# Patient Record
Sex: FEMALE | Race: WHITE | NOT HISPANIC OR LATINO | Employment: FULL TIME | URBAN - METROPOLITAN AREA
[De-identification: names, ages, dates, MRNs, and addresses within clinical notes are randomized per-mention and may not be internally consistent; named-entity substitution may affect disease eponyms.]

---

## 2020-07-17 LAB
EXTERNAL CHLAMYDIA SCREEN: NEGATIVE
EXTERNAL GONORRHEA SCREEN: NEGATIVE
EXTERNAL HEPATITIS B SURFACE ANTIGEN: NEGATIVE
EXTERNAL HEPATITIS C AB: NEGATIVE
EXTERNAL RUBELLA QUALITATIVE: NORMAL
EXTERNAL SYPHILIS RPR SCREEN: NORMAL
EXTERNAL URINE DRUG SCREEN: NEGATIVE
HIV1 P24 AG SERPL QL IA: NORMAL

## 2020-09-25 ENCOUNTER — ROUTINE PRENATAL (OUTPATIENT)
Dept: OBSTETRICS AND GYNECOLOGY | Facility: CLINIC | Age: 30
End: 2020-09-25

## 2020-09-25 VITALS — WEIGHT: 130.3 LBS | DIASTOLIC BLOOD PRESSURE: 62 MMHG | SYSTOLIC BLOOD PRESSURE: 108 MMHG

## 2020-09-25 DIAGNOSIS — Z3A.18 18 WEEKS GESTATION OF PREGNANCY: Primary | ICD-10-CM

## 2020-09-25 LAB
GLUCOSE UR STRIP-MCNC: NEGATIVE MG/DL
PROT UR STRIP-MCNC: NEGATIVE MG/DL

## 2020-09-25 PROCEDURE — 0502F SUBSEQUENT PRENATAL CARE: CPT | Performed by: OBSTETRICS & GYNECOLOGY

## 2020-09-25 RX ORDER — PRENATAL VIT NO.126/IRON/FOLIC 28MG-0.8MG
TABLET ORAL DAILY
COMMUNITY

## 2020-09-25 NOTE — PROGRESS NOTES
OB FOLLOW UP  CC- Here for care of pregnancy        Anayeli Guevara is a 29 y.o.  18w1d patient being seen today for her obstetrical follow up visit. Patient reports headache. Pt denies vision changes. Tylenol does help with headaches. Has noticed a correlation  with sugar intake and her headaches.  She tries to avoid a lot of sugar, but will have something sweet in the afternoon and will get a headache shortly after.      Her prenatal care is complicated by (and status) :    There is no problem list on file for this patient.      Flu Status: Declines    The additional following portions of the patient's history were reviewed and updated as appropriate: allergies, current medications, past family history, past medical history, past social history, past surgical history and problem list.    ROS -   Patient Reports : Headaches  Patient Denies: Loss of Fluid, Vaginal Spotting, Vision Changes and Headaches  Fetal Movement : normal  All other systems reviewed and are negative.     I have reviewed and agree with the HPI, ROS, and historical information as entered above. Gilmar Estrada MD    /62   Wt 59.1 kg (130 lb 4.8 oz)   LMP 2020 (Exact Date)     Ultrasound Today: yes- anatomy scan     EXAM:   Vitals: See prenatal flowsheet   Abdomen: See prenatal flowsheet   Urine glucose/protein: See prenatal flowsheet   Pelvic: See prenatal flowsheet   HRT: See prenatal flowsheet   Presentation: See prenatal flowsheet   Movement: See prenatal flowsheet          Assessment and Plan    Problem List Items Addressed This Visit     None      Visit Diagnoses     18 weeks gestation of pregnancy    -  Primary    Relevant Orders    POC Urinalysis Dipstick (Completed)          1. Pregnancy at 18w1d  2. Fetal status reassuring.   3. Activity and Exercise discussed.  Return in about 4 weeks (around 10/23/2020) for prenatal visit.  EIF  Gilmar Estrada MD  2020

## 2020-10-26 ENCOUNTER — ROUTINE PRENATAL (OUTPATIENT)
Dept: OBSTETRICS AND GYNECOLOGY | Facility: CLINIC | Age: 30
End: 2020-10-26

## 2020-10-26 VITALS — DIASTOLIC BLOOD PRESSURE: 60 MMHG | WEIGHT: 138 LBS | SYSTOLIC BLOOD PRESSURE: 108 MMHG

## 2020-10-26 DIAGNOSIS — Z3A.22 22 WEEKS GESTATION OF PREGNANCY: Primary | ICD-10-CM

## 2020-10-26 LAB
GLUCOSE UR STRIP-MCNC: NEGATIVE MG/DL
PROT UR STRIP-MCNC: NEGATIVE MG/DL

## 2020-10-26 PROCEDURE — 0502F SUBSEQUENT PRENATAL CARE: CPT | Performed by: NURSE PRACTITIONER

## 2020-10-26 NOTE — PROGRESS NOTES
OB FOLLOW UP    Anayeli Guevara is a 29 y.o.  22w4d patient being seen today for her obstetrical follow up visit. Patient reports pelvic pressure. She denies dysuria, spotting, but does have normal discharge.     Her prenatal care is complicated by (and status) :    There is no problem list on file for this patient.      ROS -   Patient Reports : pelvic pressure  Patient Denies:  Loss of Fluid, Vaginal Spotting, Vision Changes, Headaches and Cramping/Contractions   Fetal Movement : normal    I have reviewed and agree with the HPI, ROS, and historical information as entered above. Ryanne Waggoner, APRN    /60   Wt 62.6 kg (138 lb)   LMP 2020 (Exact Date)     Ultrasound Today: no    EXAM:  Vitals: See prenatal flowsheet   Abdomen: See prenatal flowsheet   Urine glucose/protein: See prenatal flowsheet   HRT: See prenatal flowsheet   Presentation: See prenatal flowsheet       Pelvic: Cervix closed on exam     Assessment    1. Pregnancy at 22w4d  2. Fetal status reassuring     Problem List Items Addressed This Visit     None      Visit Diagnoses     22 weeks gestation of pregnancy    -  Primary    Relevant Orders    POC Urinalysis Dipstick (Completed)          Plan    1. RTC in 4 weeks  2. Cervix closed on today's exam        GETACHEW Contreras  10/26/2020

## 2020-12-04 ENCOUNTER — ROUTINE PRENATAL (OUTPATIENT)
Dept: OBSTETRICS AND GYNECOLOGY | Facility: CLINIC | Age: 30
End: 2020-12-04

## 2020-12-04 ENCOUNTER — CLINICAL SUPPORT (OUTPATIENT)
Dept: OBSTETRICS AND GYNECOLOGY | Facility: CLINIC | Age: 30
End: 2020-12-04

## 2020-12-04 VITALS
SYSTOLIC BLOOD PRESSURE: 120 MMHG | HEIGHT: 61 IN | WEIGHT: 144.8 LBS | BODY MASS INDEX: 27.34 KG/M2 | DIASTOLIC BLOOD PRESSURE: 60 MMHG

## 2020-12-04 DIAGNOSIS — O26.893 RH NEGATIVE STATUS DURING PREGNANCY IN THIRD TRIMESTER: ICD-10-CM

## 2020-12-04 DIAGNOSIS — Z34.90 PREGNANCY, UNSPECIFIED GESTATIONAL AGE: ICD-10-CM

## 2020-12-04 DIAGNOSIS — Z3A.28 28 WEEKS GESTATION OF PREGNANCY: Primary | ICD-10-CM

## 2020-12-04 DIAGNOSIS — Z67.91 RH NEGATIVE STATUS DURING PREGNANCY IN THIRD TRIMESTER: ICD-10-CM

## 2020-12-04 LAB
EXPIRATION DATE: 0
EXTERNAL GTT 1 HOUR: 86
GLUCOSE UR STRIP-MCNC: NEGATIVE MG/DL
Lab: 0
PROT UR STRIP-MCNC: NEGATIVE MG/DL

## 2020-12-04 PROCEDURE — 96372 THER/PROPH/DIAG INJ SC/IM: CPT | Performed by: OBSTETRICS & GYNECOLOGY

## 2020-12-04 PROCEDURE — 0502F SUBSEQUENT PRENATAL CARE: CPT | Performed by: OBSTETRICS & GYNECOLOGY

## 2020-12-04 NOTE — PROGRESS NOTES
"OB FOLLOW UP    Anayeli Guevara is a 30 y.o.  28w1d patient being seen today for her obstetrical follow up visit. Patient reports no complaints. 28wk teaching performed. Instructed patient to return to nurses' station for Rhogam injection after lab draw. Verified understanding.    Her prenatal care is complicated by (and status): Rh negative    ROS -   Contractions: no   problems: no  GI problems: no  Bleeding or Leaking: no  Fetal Movement : present      Current Outpatient Medications:   •  prenatal vitamin (prenatal, CLASSIC, vitamin) tablet, Take  by mouth Daily., Disp: , Rfl:     /60   Ht 154.9 cm (61\")   Wt 65.7 kg (144 lb 12.8 oz)   LMP 2020 (Exact Date)   BMI 27.36 kg/m²     FHT:  150 BPM    Uterine Size: size equals dates   Presentations: unsure        Uterus-nontender   Assessment    1. Pregnancy at 28w1d  2. Fetal status reassuring     Problem List Items Addressed This Visit     None      Visit Diagnoses     28 weeks gestation of pregnancy    -  Primary    Relevant Orders    CBC (No Diff)    Antibody Screen    Gestational Screen 1 Hr ([facility])    Pregnancy, unspecified gestational age        Relevant Orders    POC Glucose, Urine, Qualitative, Dipstick (Completed)    POC Protein, Urine, Qualitative, Dipstick (Completed)    Rh negative status during pregnancy in third trimester        Relevant Orders    Rhogam Immune Globulin Immunization          Plan    1. P/patient return in 4 weeks  2. Prenatal teaching done and patient questions were answered  3. Ultrasound at 34    Gilmar Estrada MD  2020  "

## 2020-12-05 LAB
BLD GP AB SCN SERPL QL: NEGATIVE
ERYTHROCYTE [DISTWIDTH] IN BLOOD BY AUTOMATED COUNT: 11.8 % (ref 12.3–15.4)
GLUCOSE 1H P 50 G GLC PO SERPL-MCNC: 86 MG/DL (ref 65–139)
HCT VFR BLD AUTO: 32.5 % (ref 34–46.6)
HGB BLD-MCNC: 10.9 G/DL (ref 12–15.9)
MCH RBC QN AUTO: 29.9 PG (ref 26.6–33)
MCHC RBC AUTO-ENTMCNC: 33.5 G/DL (ref 31.5–35.7)
MCV RBC AUTO: 89.3 FL (ref 79–97)
PLATELET # BLD AUTO: 190 10*3/MM3 (ref 140–450)
RBC # BLD AUTO: 3.64 10*6/MM3 (ref 3.77–5.28)
WBC # BLD AUTO: 8.62 10*3/MM3 (ref 3.4–10.8)

## 2020-12-31 ENCOUNTER — ROUTINE PRENATAL (OUTPATIENT)
Dept: OBSTETRICS AND GYNECOLOGY | Facility: CLINIC | Age: 30
End: 2020-12-31

## 2020-12-31 VITALS — DIASTOLIC BLOOD PRESSURE: 68 MMHG | WEIGHT: 153.4 LBS | SYSTOLIC BLOOD PRESSURE: 118 MMHG | BODY MASS INDEX: 28.98 KG/M2

## 2020-12-31 DIAGNOSIS — Z3A.32 32 WEEKS GESTATION OF PREGNANCY: Primary | ICD-10-CM

## 2020-12-31 LAB
EXPIRATION DATE: 0
GLUCOSE UR STRIP-MCNC: NEGATIVE MG/DL
Lab: 0
PROT UR STRIP-MCNC: NEGATIVE MG/DL

## 2020-12-31 PROCEDURE — 90715 TDAP VACCINE 7 YRS/> IM: CPT | Performed by: NURSE PRACTITIONER

## 2020-12-31 PROCEDURE — 90471 IMMUNIZATION ADMIN: CPT | Performed by: NURSE PRACTITIONER

## 2020-12-31 PROCEDURE — 0502F SUBSEQUENT PRENATAL CARE: CPT | Performed by: NURSE PRACTITIONER

## 2020-12-31 NOTE — PROGRESS NOTES
OB FOLLOW UP  CC- Here for care of pregnancy        Anayeli Guevara is a 30 y.o.  32w0d patient being seen today for her obstetrical follow up visit. Patient reports irregular contractions and occasional headaches. .     Her prenatal care is complicated by (and status) :  EIF      Flu Status: Declines  Ultrasound Today: No.    ROS -   Patient Reports : Headaches and Contractions  Patient Denies: Loss of Fluid, Vaginal Spotting, Vision Changes, Nausea , Vomiting  and Epigastric pain  Fetal Movement : normal  All other systems reviewed and are negative.       The additional following portions of the patient's history were reviewed and updated as appropriate: allergies, current medications, past family history, past medical history, past social history, past surgical history and problem list.    I have reviewed and agree with the HPI, ROS, and historical information as entered above. Lindsay Flowers, APRN    /68   Wt 69.6 kg (153 lb 6.4 oz)   LMP 2020 (Exact Date)   BMI 28.98 kg/m²       EXAM:     FHT:  wnl  Uterine Size: 32 cm  Pelvic Exam: deferred    Urine glucose/protein: See prenatal flowsheet       Assessment and Plan    Problem List Items Addressed This Visit     None      Visit Diagnoses     32 weeks gestation of pregnancy    -  Primary    Relevant Orders    POC Glucose, Urine, Qualitative, Dipstick (Completed)    POC Protein, Urine, Qualitative, Dipstick (Completed)    Tdap Vaccine Greater Than or Equal To 6yo IM (Completed)          1. Pregnancy at 32w0d  2. Fetal status reassuring.   3. Activity and Exercise discussed.  Return in about 2 weeks (around 2021) for growth US/ FU EIF.    Lindsay Flowers, APRN  2020

## 2021-01-13 ENCOUNTER — ROUTINE PRENATAL (OUTPATIENT)
Dept: OBSTETRICS AND GYNECOLOGY | Facility: CLINIC | Age: 31
End: 2021-01-13

## 2021-01-13 VITALS — BODY MASS INDEX: 28.72 KG/M2 | WEIGHT: 152 LBS | SYSTOLIC BLOOD PRESSURE: 122 MMHG | DIASTOLIC BLOOD PRESSURE: 64 MMHG

## 2021-01-13 DIAGNOSIS — Z3A.33 33 WEEKS GESTATION OF PREGNANCY: ICD-10-CM

## 2021-01-13 DIAGNOSIS — IMO0001 FETAL CARDIAC ECHOGENIC FOCUS, SINGLE OR UNSPECIFIED FETUS: Primary | ICD-10-CM

## 2021-01-13 PROBLEM — Z3A.34 34 WEEKS GESTATION OF PREGNANCY: Status: ACTIVE | Noted: 2021-01-13

## 2021-01-13 LAB
EXPIRATION DATE: NORMAL
GLUCOSE UR STRIP-MCNC: NEGATIVE MG/DL
Lab: NORMAL
PROT UR STRIP-MCNC: NEGATIVE MG/DL

## 2021-01-13 PROCEDURE — 76819 FETAL BIOPHYS PROFIL W/O NST: CPT | Performed by: OBSTETRICS & GYNECOLOGY

## 2021-01-13 PROCEDURE — 0502F SUBSEQUENT PRENATAL CARE: CPT | Performed by: OBSTETRICS & GYNECOLOGY

## 2021-01-13 RX ORDER — MAGNESIUM 30 MG
TABLET ORAL 2 TIMES DAILY
COMMUNITY
End: 2021-02-24 | Stop reason: HOSPADM

## 2021-01-13 RX ORDER — CHLORAL HYDRATE 500 MG
CAPSULE ORAL
COMMUNITY
End: 2021-02-24 | Stop reason: HOSPADM

## 2021-01-13 NOTE — PROGRESS NOTES
OB FOLLOW UP    Anayeli Guevara is a 30 y.o.  33w6d patient being seen today for her obstetrical follow up visit. Patient reports occasional contractions and dimitris oden.     She reports frequent dimitris oden and occasional contractions. Contractions are less than 5 per hours.      She denies LOF, vaginal spotting, headaches, vision changes or swelling. She reports >10 movements in 10 hours.    She had an ultrasound today for follow up growth.  FHR- 150 bpm.  BPP 8/8.     Her prenatal care is complicated by (and status) : Normal upper normal fluid    -         Current Outpatient Medications:   •  docusate sodium (COLACE) 50 MG capsule, Take  by mouth 2 (Two) Times a Day., Disp: , Rfl:   •  Loratadine (CLARITIN PO), Take  by mouth., Disp: , Rfl:   •  magnesium 30 MG tablet, Take  by mouth 2 (Two) Times a Day., Disp: , Rfl:   •  Omega-3 Fatty Acids (fish oil) 1000 MG capsule capsule, Take  by mouth Daily With Breakfast., Disp: , Rfl:   •  prenatal vitamin (prenatal, CLASSIC, vitamin) tablet, Take  by mouth Daily., Disp: , Rfl:     /64   Wt 68.9 kg (152 lb)   LMP 2020 (Exact Date)   BMI 28.72 kg/m²     FHT:  150 BPM    Uterine Size: size equals dates   Presentations: cephalic        Uterus-nontender   Assessment    1. Pregnancy at 33w6d  2. Fetal status reassuring     Problem List Items Addressed This Visit     None      Visit Diagnoses     Fetal cardiac echogenic focus, single or unspecified fetus    -  Primary    Relevant Orders    US Fetal Echo Followup    US Ob Follow Up Transabdominal Approach    US Ob Limited 1 + Fetuses    33 weeks gestation of pregnancy        Relevant Orders    POC Glucose, Urine, Qualitative, Dipstick (Completed)    POC Protein, Urine, Qualitative, Dipstick (Completed)          Plan    1. P/patient return in 2 weeks  2. Prenatal teaching done and patient questions were answered  3. Return to clinic 2 weeks    Gilmar Estrada MD  2021

## 2021-01-28 ENCOUNTER — ROUTINE PRENATAL (OUTPATIENT)
Dept: OBSTETRICS AND GYNECOLOGY | Facility: CLINIC | Age: 31
End: 2021-01-28

## 2021-01-28 DIAGNOSIS — Z3A.36 36 WEEKS GESTATION OF PREGNANCY: Primary | ICD-10-CM

## 2021-01-28 LAB
EXPIRATION DATE: 0
GLUCOSE UR STRIP-MCNC: NEGATIVE MG/DL
Lab: 0
PROT UR STRIP-MCNC: NEGATIVE MG/DL

## 2021-01-28 PROCEDURE — 87081 CULTURE SCREEN ONLY: CPT | Performed by: NURSE PRACTITIONER

## 2021-01-28 PROCEDURE — 0502F SUBSEQUENT PRENATAL CARE: CPT | Performed by: NURSE PRACTITIONER

## 2021-01-28 NOTE — PROGRESS NOTES
OB FOLLOW UP  CC- Here for care of pregnancy        Anayeli Guevara is a 30 y.o.  36w0d patient being seen today for her obstetrical follow up visit. Patient reports no complaints and irregular painful contractions.     Her prenatal care is complicated by (and status) :  none        Flu Status: Declines  GBS Status: Done Today  Her Delivery Plan is: vaginal delivery on own  Pt states has birthing plan written out  Ultrasound Today: No.    ROS -   Patient Reports : Contractions  Patient Denies: Loss of Fluid, Vaginal Spotting, Vision Changes, Headaches, Nausea , Vomiting  and Epigastric pain  Fetal Movement : normal  All other systems reviewed and are negative.       The additional following portions of the patient's history were reviewed and updated as appropriate: allergies, current medications, past family history, past medical history, past social history, past surgical history and problem list.    I have reviewed and agree with the HPI, ROS, and historical information as entered above. Lindsay Flowers, APRN        LMP 2020 (Exact Date)     EXAM:     FHT: wnl  Uterine Size: 36 cm  Pelvic Exam: cervix closed    Urine glucose/protein: See prenatal flowsheet       Assessment and Plan    Problem List Items Addressed This Visit     None      Visit Diagnoses     36 weeks gestation of pregnancy    -  Primary    Relevant Orders    POC Glucose, Urine, Qualitative, Dipstick (Completed)    POC Protein, Urine, Qualitative, Dipstick (Completed)    Group B Streptococcus Culture - Swab, Vaginal/Rectum          1. Pregnancy at 36w0d  2. Fetal status reassuring.   3. Activity and Exercise discussed. Rev daily FMC, labor prec preecl prec.  Return in about 1 week (around 2021).    Lindsay Flowers, APRN  2021

## 2021-01-30 LAB — BACTERIA SPEC AEROBE CULT: NORMAL

## 2021-02-04 ENCOUNTER — ROUTINE PRENATAL (OUTPATIENT)
Dept: OBSTETRICS AND GYNECOLOGY | Facility: CLINIC | Age: 31
End: 2021-02-04

## 2021-02-04 VITALS — SYSTOLIC BLOOD PRESSURE: 118 MMHG | WEIGHT: 159.8 LBS | DIASTOLIC BLOOD PRESSURE: 64 MMHG | BODY MASS INDEX: 30.19 KG/M2

## 2021-02-04 DIAGNOSIS — O26.849 FETAL SIZE INCONSISTENT WITH DATES: ICD-10-CM

## 2021-02-04 DIAGNOSIS — Z3A.37 37 WEEKS GESTATION OF PREGNANCY: Primary | ICD-10-CM

## 2021-02-04 PROCEDURE — 59025 FETAL NON-STRESS TEST: CPT | Performed by: NURSE PRACTITIONER

## 2021-02-04 PROCEDURE — 0502F SUBSEQUENT PRENATAL CARE: CPT | Performed by: NURSE PRACTITIONER

## 2021-02-04 NOTE — PROGRESS NOTES
OB FOLLOW UP  CC- Here for care of pregnancy        Anayeli Guevara is a 30 y.o.  37w0d patient being seen today for her obstetrical follow up visit. Patient reports dimitris oden. contractions and changes in fetal movement.     She reports that dimitris oden are starting to become more frequent.  Fetal movement has still been >10 movements in 10 hours, but has significantly decreased from her normal.     She denies LOF, vaginal spotting, headaches, vision changes or swelling.    Her prenatal care is complicated by (and status) :  none        Flu Status: Declines  GBS Status: Was already done and it was negative.   Her Delivery Plan is: vaginal delivery on her own, she reports that she has her birthing plan written out  Ultrasound Today: No.    ROS -   Patient Reports : Contractions and changes is fetal movement  Patient Denies: Loss of Fluid, Vaginal Spotting, Vision Changes, Headaches, Nausea , Vomiting  and Epigastric pain  Fetal Movement : decreased, but still >10 movements in 10 hours  All other systems reviewed and are negative.       The additional following portions of the patient's history were reviewed and updated as appropriate: allergies, current medications, past family history, past medical history, past social history, past surgical history and problem list.    I have reviewed and agree with the HPI, ROS, and historical information as entered above. Lindsay Traylorf, APRN        /64   Wt 72.5 kg (159 lb 12.8 oz)   LMP 2020 (Exact Date)   BMI 30.19 kg/m²     EXAM:     FHT: wnl  Uterine Size: 39 cm  Pelvic Exam: cervix closed    Urine glucose/protein: See prenatal flowsheet       Assessment and Plan    Problem List Items Addressed This Visit     None      Visit Diagnoses     37 weeks gestation of pregnancy    -  Primary    Relevant Orders    POC Glucose, Urine, Qualitative, Dipstick (Completed)    POC Protein, Urine, Qualitative, Dipstick (Completed)    Fetal size inconsistent with dates         Relevant Orders    US Ob Follow Up Transabdominal Approach          1. Pregnancy at 37w0d  2. Fetal status reassuring. NST reactive with ctxs.  3. Activity and Exercise discussed.  Rev daily FMC, labor prec, preecl prec.  Return in about 4 days (around 2/8/2021) for with RWO; growth US first.    Lindsay Flowers, APRN  02/04/2021

## 2021-02-08 ENCOUNTER — ROUTINE PRENATAL (OUTPATIENT)
Dept: OBSTETRICS AND GYNECOLOGY | Facility: CLINIC | Age: 31
End: 2021-02-08

## 2021-02-08 VITALS — BODY MASS INDEX: 30.61 KG/M2 | DIASTOLIC BLOOD PRESSURE: 72 MMHG | WEIGHT: 162 LBS | SYSTOLIC BLOOD PRESSURE: 120 MMHG

## 2021-02-08 DIAGNOSIS — O26.849 FETAL SIZE INCONSISTENT WITH DATES: ICD-10-CM

## 2021-02-08 LAB
GLUCOSE UR STRIP-MCNC: NEGATIVE MG/DL
PROT UR STRIP-MCNC: NEGATIVE MG/DL

## 2021-02-08 PROCEDURE — 0502F SUBSEQUENT PRENATAL CARE: CPT | Performed by: OBSTETRICS & GYNECOLOGY

## 2021-02-08 PROCEDURE — 76816 OB US FOLLOW-UP PER FETUS: CPT | Performed by: OBSTETRICS & GYNECOLOGY

## 2021-02-08 NOTE — PROGRESS NOTES
OB FOLLOW UP  CC- Here for care of pregnancy        Anayeli Guevara is a 30 y.o.  37w4d patient being seen today for her obstetrical follow up visit. Patient reports no complaints.     Her prenatal care is complicated by (and status) :    Patient Active Problem List   Diagnosis   • 34 weeks gestation of pregnancy         Flu Status: Already given in current flu season or Declines  GBS Status: Was already done and it was negative.   Her Delivery Plan is: Undecided  Ultrasound Today: Yes.  Findings showed normal growth 66%.  I have personally evaluated the U/S and agree with the findings. Gilmar Estrada MD    ROS -   Patient Reports : No Problems  Patient Denies: Loss of Fluid, Vaginal Spotting, Vision Changes, Headaches, Nausea , Vomiting , Contractions and Epigastric pain  Fetal Movement : normal  All other systems reviewed and are negative.       The additional following portions of the patient's history were reviewed and updated as appropriate: allergies, current medications, past family history, past medical history, past social history, past surgical history and problem list.    I have reviewed and agree with the HPI, ROS, and historical information as entered above. Gilmar Estrada MD        /72   Wt 73.5 kg (162 lb)   LMP 2020 (Exact Date)   BMI 30.61 kg/m²     EXAM:     FHT: 140 BPM   Uterine Size: size equals dates  Pelvic Exam: Yes.  Presentation: cephalic. Dilation: 0-1. Effacement: 50%. Station: -2.    Urine glucose/protein: See prenatal flowsheet       Assessment and Plan    Problem List Items Addressed This Visit     None      Visit Diagnoses     Fetal size inconsistent with dates        Relevant Orders    POC Urinalysis Dipstick (Completed)          1. Pregnancy at 37w4d  2. Fetal status reassuring.   3. Activity and Exercise discussed.  Return in about 1 week (around 2/15/2021) for prenatal visit.    Gilmar Estrada MD  2021

## 2021-02-10 ENCOUNTER — TELEPHONE (OUTPATIENT)
Dept: OBSTETRICS AND GYNECOLOGY | Facility: CLINIC | Age: 31
End: 2021-02-10

## 2021-02-10 ENCOUNTER — ROUTINE PRENATAL (OUTPATIENT)
Dept: OBSTETRICS AND GYNECOLOGY | Facility: CLINIC | Age: 31
End: 2021-02-10

## 2021-02-10 VITALS — BODY MASS INDEX: 31.18 KG/M2 | DIASTOLIC BLOOD PRESSURE: 70 MMHG | SYSTOLIC BLOOD PRESSURE: 136 MMHG | WEIGHT: 165 LBS

## 2021-02-10 DIAGNOSIS — Z3A.37 37 WEEKS GESTATION OF PREGNANCY: Primary | ICD-10-CM

## 2021-02-10 DIAGNOSIS — O42.90 PREMATURE RUPTURE OF MEMBRANES, UNSPECIFIED DURATION TO ONSET OF LABOR, UNSPECIFIED GESTATIONAL AGE: ICD-10-CM

## 2021-02-10 PROCEDURE — 0502F SUBSEQUENT PRENATAL CARE: CPT | Performed by: OBSTETRICS & GYNECOLOGY

## 2021-02-10 NOTE — PROGRESS NOTES
OB FOLLOW UP  CC- Here for care of pregnancy        Anayeli Guevara is a 30 y.o.  37w6d patient being seen today for her obstetrical follow up visit. Patient reports has had 3 contractions in the last 30 minutes. She reports possible rupture of membranes.    Her prenatal care is complicated by (and status) :    Patient Active Problem List   Diagnosis   • 34 weeks gestation of pregnancy   • 37 weeks gestation of pregnancy       Flu Status: Declines  Ultrasound Today: No.    Patient had Rhogam on 2020    ROS -   Patient Reports :  Loss of fluids, edema in feet, contractions  Patient Denies: vaginal bleeding, loss of fluids, edema, nausea, vomiting, cramping, dimitris oden, contractions, headaches, vision changes, dysuria, constipation, heartburn  Fetal Movement : normal  All other systems reviewed and are negative.       The additional following portions of the patient's history were reviewed and updated as appropriate: allergies, current medications, past family history, past medical history, past social history, past surgical history and problem list.    I have reviewed and agree with the HPI, ROS, and historical information as entered above. Gilmar Estrada MD    /70   Wt 74.8 kg (165 lb)   LMP 2020 (Exact Date)   BMI 31.18 kg/m²       EXAM:     FHT: 140 BPM   Uterine Size: size equals dates  Pelvic Exam: No    Urine glucose/protein: See prenatal flowsheet       Assessment and Plan    Problem List Items Addressed This Visit        Gravid and     37 weeks gestation of pregnancy - Primary    Relevant Orders    POC Glucose, Urine, Qualitative, Dipstick (Completed)    POC Protein, Urine, Qualitative, Dipstick (Completed)    US Fetal Biophysical Profile;Without Non-Stress Testing      Other Visit Diagnoses     Premature rupture of membranes, unspecified duration to onset of labor, unspecified gestational age        Relevant Orders    US Fetal Biophysical Profile;Without Non-Stress  Testing          1. Pregnancy at 37w6d  2. Fetal status reassuring.   3. Activity and Exercise discussed.  No follow-ups on file.    Gilmar Estrada MD  02/10/2021

## 2021-02-15 ENCOUNTER — ROUTINE PRENATAL (OUTPATIENT)
Dept: OBSTETRICS AND GYNECOLOGY | Facility: CLINIC | Age: 31
End: 2021-02-15

## 2021-02-15 VITALS — WEIGHT: 163 LBS | SYSTOLIC BLOOD PRESSURE: 118 MMHG | DIASTOLIC BLOOD PRESSURE: 64 MMHG | BODY MASS INDEX: 30.8 KG/M2

## 2021-02-15 DIAGNOSIS — Z3A.38 38 WEEKS GESTATION OF PREGNANCY: Primary | ICD-10-CM

## 2021-02-15 LAB
GLUCOSE UR STRIP-MCNC: NEGATIVE MG/DL
PROT UR STRIP-MCNC: NEGATIVE MG/DL

## 2021-02-15 PROCEDURE — 0502F SUBSEQUENT PRENATAL CARE: CPT | Performed by: OBSTETRICS & GYNECOLOGY

## 2021-02-15 NOTE — PROGRESS NOTES
OB FOLLOW UP    Anayeli Guevara is a 30 y.o.  38w4d patient being seen today for her obstetrical follow up visit. Patient reports no complaints.       ROS -   Contractions: Yes   problems: No  GI problems: No  Bleeding or Leaking:No  Fetal Movement : Yes      Current Outpatient Medications:   •  docusate sodium (COLACE) 50 MG capsule, Take  by mouth 2 (Two) Times a Day., Disp: , Rfl:   •  Loratadine (CLARITIN PO), Take  by mouth., Disp: , Rfl:   •  magnesium 30 MG tablet, Take  by mouth 2 (Two) Times a Day., Disp: , Rfl:   •  Omega-3 Fatty Acids (fish oil) 1000 MG capsule capsule, Take  by mouth Daily With Breakfast., Disp: , Rfl:   •  prenatal vitamin (prenatal, CLASSIC, vitamin) tablet, Take  by mouth Daily., Disp: , Rfl:     /64   Wt 73.9 kg (163 lb)   LMP 2020 (Exact Date)   BMI 30.80 kg/m²     FHT:  140 BPM    Uterine Size: size equals dates   Presentations: cephalic        Uterus-nontender   Assessment    1. Pregnancy at 38w4d  2. Fetal status reassuring     Problem List Items Addressed This Visit     None      Visit Diagnoses     38 weeks gestation of pregnancy    -  Primary    Relevant Orders    POC Urinalysis Dipstick (Completed)          Plan    1. P/patient return in 1 weeks  2. Prenatal teaching done and patient questions were answered  3.   Induction of labor 21 @  930 PM    Gilmar Estrada MD  02/15/2021

## 2021-02-19 ENCOUNTER — APPOINTMENT (OUTPATIENT)
Dept: PREADMISSION TESTING | Facility: HOSPITAL | Age: 31
End: 2021-02-19

## 2021-02-19 ENCOUNTER — PREP FOR SURGERY (OUTPATIENT)
Dept: OTHER | Facility: HOSPITAL | Age: 31
End: 2021-02-19

## 2021-02-19 DIAGNOSIS — Z3A.39 39 WEEKS GESTATION OF PREGNANCY: Primary | ICD-10-CM

## 2021-02-19 LAB — SARS-COV-2 RNA RESP QL NAA+PROBE: NOT DETECTED

## 2021-02-19 PROCEDURE — U0004 COV-19 TEST NON-CDC HGH THRU: HCPCS

## 2021-02-19 PROCEDURE — C9803 HOPD COVID-19 SPEC COLLECT: HCPCS

## 2021-02-19 RX ORDER — ONDANSETRON 2 MG/ML
4 INJECTION INTRAMUSCULAR; INTRAVENOUS EVERY 6 HOURS PRN
Status: CANCELLED | OUTPATIENT
Start: 2021-02-19

## 2021-02-19 RX ORDER — PROMETHAZINE HYDROCHLORIDE 12.5 MG/1
12.5 SUPPOSITORY RECTAL EVERY 6 HOURS PRN
Status: CANCELLED | OUTPATIENT
Start: 2021-02-19

## 2021-02-19 RX ORDER — SODIUM CHLORIDE 0.9 % (FLUSH) 0.9 %
10 SYRINGE (ML) INJECTION AS NEEDED
Status: CANCELLED | OUTPATIENT
Start: 2021-02-19

## 2021-02-19 RX ORDER — IBUPROFEN 600 MG/1
600 TABLET ORAL EVERY 6 HOURS PRN
Status: CANCELLED | OUTPATIENT
Start: 2021-02-19

## 2021-02-19 RX ORDER — OXYTOCIN-SODIUM CHLORIDE 0.9% IV SOLN 30 UNIT/500ML 30-0.9/5 UT/ML-%
85 SOLUTION INTRAVENOUS ONCE
Status: CANCELLED | OUTPATIENT
Start: 2021-02-19 | End: 2021-02-19

## 2021-02-19 RX ORDER — BUTORPHANOL TARTRATE 1 MG/ML
1 INJECTION, SOLUTION INTRAMUSCULAR; INTRAVENOUS
Status: CANCELLED | OUTPATIENT
Start: 2021-02-19

## 2021-02-19 RX ORDER — OXYTOCIN-SODIUM CHLORIDE 0.9% IV SOLN 30 UNIT/500ML 30-0.9/5 UT/ML-%
2-24 SOLUTION INTRAVENOUS
Status: CANCELLED | OUTPATIENT
Start: 2021-02-22

## 2021-02-19 RX ORDER — ONDANSETRON 4 MG/1
4 TABLET, FILM COATED ORAL EVERY 6 HOURS PRN
Status: CANCELLED | OUTPATIENT
Start: 2021-02-19

## 2021-02-19 RX ORDER — SODIUM CHLORIDE 0.9 % (FLUSH) 0.9 %
3 SYRINGE (ML) INJECTION EVERY 12 HOURS SCHEDULED
Status: CANCELLED | OUTPATIENT
Start: 2021-02-19

## 2021-02-19 RX ORDER — SODIUM CHLORIDE, SODIUM LACTATE, POTASSIUM CHLORIDE, CALCIUM CHLORIDE 600; 310; 30; 20 MG/100ML; MG/100ML; MG/100ML; MG/100ML
125 INJECTION, SOLUTION INTRAVENOUS CONTINUOUS
Status: CANCELLED | OUTPATIENT
Start: 2021-02-19

## 2021-02-19 RX ORDER — LIDOCAINE HYDROCHLORIDE 10 MG/ML
5 INJECTION, SOLUTION EPIDURAL; INFILTRATION; INTRACAUDAL; PERINEURAL AS NEEDED
Status: CANCELLED | OUTPATIENT
Start: 2021-02-19

## 2021-02-19 RX ORDER — MAGNESIUM CARB/ALUMINUM HYDROX 105-160MG
30 TABLET,CHEWABLE ORAL ONCE
Status: CANCELLED | OUTPATIENT
Start: 2021-02-19 | End: 2021-02-19

## 2021-02-19 RX ORDER — OXYTOCIN-SODIUM CHLORIDE 0.9% IV SOLN 30 UNIT/500ML 30-0.9/5 UT/ML-%
650 SOLUTION INTRAVENOUS ONCE
Status: CANCELLED | OUTPATIENT
Start: 2021-02-19 | End: 2021-02-19

## 2021-02-19 RX ORDER — PROMETHAZINE HYDROCHLORIDE 12.5 MG/1
12.5 TABLET ORAL EVERY 6 HOURS PRN
Status: CANCELLED | OUTPATIENT
Start: 2021-02-19

## 2021-02-19 RX ORDER — ACETAMINOPHEN 325 MG/1
650 TABLET ORAL EVERY 4 HOURS PRN
Status: CANCELLED | OUTPATIENT
Start: 2021-02-19

## 2021-02-21 ENCOUNTER — HOSPITAL ENCOUNTER (OUTPATIENT)
Dept: LABOR AND DELIVERY | Facility: HOSPITAL | Age: 31
Discharge: HOME | End: 2021-02-21

## 2021-02-21 ENCOUNTER — HOSPITAL ENCOUNTER (INPATIENT)
Facility: HOSPITAL | Age: 31
LOS: 3 days | Discharge: HOME | End: 2021-02-24
Attending: OBSTETRICS & GYNECOLOGY | Admitting: OBSTETRICS & GYNECOLOGY

## 2021-02-21 DIAGNOSIS — Z3A.39 39 WEEKS GESTATION OF PREGNANCY: ICD-10-CM

## 2021-02-21 PROBLEM — Z34.90 PREGNANCY: Status: ACTIVE | Noted: 2021-02-21

## 2021-02-21 LAB
ABO GROUP BLD: NORMAL
BLD GP AB SCN SERPL QL: POSITIVE
DEPRECATED RDW RBC AUTO: 43.2 FL (ref 37–54)
ERYTHROCYTE [DISTWIDTH] IN BLOOD BY AUTOMATED COUNT: 13 % (ref 12.3–15.4)
HCT VFR BLD AUTO: 37.5 % (ref 34–46.6)
HGB BLD-MCNC: 12.4 G/DL (ref 12–15.9)
MCH RBC QN AUTO: 30.3 PG (ref 26.6–33)
MCHC RBC AUTO-ENTMCNC: 33.1 G/DL (ref 31.5–35.7)
MCV RBC AUTO: 91.7 FL (ref 79–97)
PLATELET # BLD AUTO: 185 10*3/MM3 (ref 140–450)
PMV BLD AUTO: 9.9 FL (ref 6–12)
RBC # BLD AUTO: 4.09 10*6/MM3 (ref 3.77–5.28)
RH BLD: NEGATIVE
T&S EXPIRATION DATE: NORMAL
WBC # BLD AUTO: 9.51 10*3/MM3 (ref 3.4–10.8)

## 2021-02-21 PROCEDURE — 86901 BLOOD TYPING SEROLOGIC RH(D): CPT

## 2021-02-21 PROCEDURE — 86900 BLOOD TYPING SEROLOGIC ABO: CPT

## 2021-02-21 PROCEDURE — 86870 RBC ANTIBODY IDENTIFICATION: CPT | Performed by: OBSTETRICS & GYNECOLOGY

## 2021-02-21 PROCEDURE — 59200 INSERT CERVICAL DILATOR: CPT | Performed by: OBSTETRICS & GYNECOLOGY

## 2021-02-21 PROCEDURE — 85027 COMPLETE CBC AUTOMATED: CPT | Performed by: OBSTETRICS & GYNECOLOGY

## 2021-02-21 PROCEDURE — 59025 FETAL NON-STRESS TEST: CPT

## 2021-02-21 PROCEDURE — 36415 COLL VENOUS BLD VENIPUNCTURE: CPT | Performed by: OBSTETRICS & GYNECOLOGY

## 2021-02-21 PROCEDURE — 86901 BLOOD TYPING SEROLOGIC RH(D): CPT | Performed by: OBSTETRICS & GYNECOLOGY

## 2021-02-21 PROCEDURE — 86900 BLOOD TYPING SEROLOGIC ABO: CPT | Performed by: OBSTETRICS & GYNECOLOGY

## 2021-02-21 PROCEDURE — 86850 RBC ANTIBODY SCREEN: CPT | Performed by: OBSTETRICS & GYNECOLOGY

## 2021-02-21 RX ORDER — BUTORPHANOL TARTRATE 1 MG/ML
1 INJECTION, SOLUTION INTRAMUSCULAR; INTRAVENOUS
Status: DISCONTINUED | OUTPATIENT
Start: 2021-02-21 | End: 2021-02-22 | Stop reason: HOSPADM

## 2021-02-21 RX ORDER — LIDOCAINE HYDROCHLORIDE 10 MG/ML
5 INJECTION, SOLUTION EPIDURAL; INFILTRATION; INTRACAUDAL; PERINEURAL AS NEEDED
Status: DISCONTINUED | OUTPATIENT
Start: 2021-02-21 | End: 2021-02-22 | Stop reason: HOSPADM

## 2021-02-21 RX ORDER — SODIUM CHLORIDE 0.9 % (FLUSH) 0.9 %
10 SYRINGE (ML) INJECTION AS NEEDED
Status: DISCONTINUED | OUTPATIENT
Start: 2021-02-21 | End: 2021-02-22 | Stop reason: HOSPADM

## 2021-02-21 RX ORDER — SODIUM CHLORIDE, SODIUM LACTATE, POTASSIUM CHLORIDE, CALCIUM CHLORIDE 600; 310; 30; 20 MG/100ML; MG/100ML; MG/100ML; MG/100ML
125 INJECTION, SOLUTION INTRAVENOUS CONTINUOUS
Status: DISCONTINUED | OUTPATIENT
Start: 2021-02-21 | End: 2021-02-24 | Stop reason: HOSPADM

## 2021-02-21 RX ORDER — SODIUM CHLORIDE 0.9 % (FLUSH) 0.9 %
3 SYRINGE (ML) INJECTION EVERY 12 HOURS SCHEDULED
Status: DISCONTINUED | OUTPATIENT
Start: 2021-02-21 | End: 2021-02-22 | Stop reason: HOSPADM

## 2021-02-21 RX ORDER — MAGNESIUM CARB/ALUMINUM HYDROX 105-160MG
30 TABLET,CHEWABLE ORAL ONCE
Status: DISCONTINUED | OUTPATIENT
Start: 2021-02-21 | End: 2021-02-22 | Stop reason: HOSPADM

## 2021-02-21 RX ORDER — OXYTOCIN-SODIUM CHLORIDE 0.9% IV SOLN 30 UNIT/500ML 30-0.9/5 UT/ML-%
2-24 SOLUTION INTRAVENOUS
Status: DISCONTINUED | OUTPATIENT
Start: 2021-02-22 | End: 2021-02-22 | Stop reason: HOSPADM

## 2021-02-21 RX ADMIN — SODIUM CHLORIDE, POTASSIUM CHLORIDE, SODIUM LACTATE AND CALCIUM CHLORIDE 125 ML/HR: 600; 310; 30; 20 INJECTION, SOLUTION INTRAVENOUS at 22:00

## 2021-02-22 ENCOUNTER — ANESTHESIA (OUTPATIENT)
Dept: LABOR AND DELIVERY | Facility: HOSPITAL | Age: 31
End: 2021-02-22

## 2021-02-22 ENCOUNTER — ANESTHESIA EVENT (OUTPATIENT)
Dept: LABOR AND DELIVERY | Facility: HOSPITAL | Age: 31
End: 2021-02-22

## 2021-02-22 LAB
ABO GROUP BLD: NORMAL
ANTI-D, PASSIVE: NORMAL
RH BLD: NEGATIVE

## 2021-02-22 PROCEDURE — 51703 INSERT BLADDER CATH COMPLEX: CPT

## 2021-02-22 PROCEDURE — 0KQM0ZZ REPAIR PERINEUM MUSCLE, OPEN APPROACH: ICD-10-PCS | Performed by: OBSTETRICS & GYNECOLOGY

## 2021-02-22 PROCEDURE — 25010000002 FENTANYL CITRATE (PF) 100 MCG/2ML SOLUTION: Performed by: NURSE ANESTHETIST, CERTIFIED REGISTERED

## 2021-02-22 PROCEDURE — 59025 FETAL NON-STRESS TEST: CPT

## 2021-02-22 PROCEDURE — 3E033VJ INTRODUCTION OF OTHER HORMONE INTO PERIPHERAL VEIN, PERCUTANEOUS APPROACH: ICD-10-PCS | Performed by: OBSTETRICS & GYNECOLOGY

## 2021-02-22 PROCEDURE — 25010000002 ONDANSETRON PER 1 MG: Performed by: OBSTETRICS & GYNECOLOGY

## 2021-02-22 PROCEDURE — 25010000002 BUTORPHANOL PER 1 MG: Performed by: OBSTETRICS & GYNECOLOGY

## 2021-02-22 PROCEDURE — 25010000002 ROPIVACAINE PER 1 MG: Performed by: NURSE ANESTHETIST, CERTIFIED REGISTERED

## 2021-02-22 PROCEDURE — C1755 CATHETER, INTRASPINAL: HCPCS | Performed by: ANESTHESIOLOGY

## 2021-02-22 PROCEDURE — C1755 CATHETER, INTRASPINAL: HCPCS

## 2021-02-22 PROCEDURE — 59400 OBSTETRICAL CARE: CPT | Performed by: OBSTETRICS & GYNECOLOGY

## 2021-02-22 RX ORDER — SODIUM CHLORIDE 0.9 % (FLUSH) 0.9 %
1-10 SYRINGE (ML) INJECTION AS NEEDED
Status: DISCONTINUED | OUTPATIENT
Start: 2021-02-22 | End: 2021-02-24 | Stop reason: HOSPADM

## 2021-02-22 RX ORDER — LANOLIN
CREAM (ML) TOPICAL
Status: DISCONTINUED | OUTPATIENT
Start: 2021-02-22 | End: 2021-02-24 | Stop reason: HOSPADM

## 2021-02-22 RX ORDER — OXYTOCIN-SODIUM CHLORIDE 0.9% IV SOLN 30 UNIT/500ML 30-0.9/5 UT/ML-%
650 SOLUTION INTRAVENOUS ONCE
Status: COMPLETED | OUTPATIENT
Start: 2021-02-22 | End: 2021-02-22

## 2021-02-22 RX ORDER — ONDANSETRON 2 MG/ML
4 INJECTION INTRAMUSCULAR; INTRAVENOUS ONCE AS NEEDED
Status: DISCONTINUED | OUTPATIENT
Start: 2021-02-22 | End: 2021-02-22 | Stop reason: HOSPADM

## 2021-02-22 RX ORDER — EPHEDRINE SULFATE 5 MG/ML
10 INJECTION INTRAVENOUS
Status: DISCONTINUED | OUTPATIENT
Start: 2021-02-22 | End: 2021-02-22 | Stop reason: HOSPADM

## 2021-02-22 RX ORDER — FENTANYL CITRATE 50 UG/ML
INJECTION, SOLUTION INTRAMUSCULAR; INTRAVENOUS AS NEEDED
Status: DISCONTINUED | OUTPATIENT
Start: 2021-02-22 | End: 2021-02-22 | Stop reason: SURG

## 2021-02-22 RX ORDER — TRISODIUM CITRATE DIHYDRATE AND CITRIC ACID MONOHYDRATE 500; 334 MG/5ML; MG/5ML
30 SOLUTION ORAL ONCE
Status: DISCONTINUED | OUTPATIENT
Start: 2021-02-22 | End: 2021-02-22 | Stop reason: HOSPADM

## 2021-02-22 RX ORDER — BISACODYL 10 MG
10 SUPPOSITORY, RECTAL RECTAL DAILY PRN
Status: DISCONTINUED | OUTPATIENT
Start: 2021-02-23 | End: 2021-02-24 | Stop reason: HOSPADM

## 2021-02-22 RX ORDER — ACETAMINOPHEN 325 MG/1
650 TABLET ORAL EVERY 4 HOURS PRN
Status: DISCONTINUED | OUTPATIENT
Start: 2021-02-22 | End: 2021-02-22 | Stop reason: HOSPADM

## 2021-02-22 RX ORDER — METOCLOPRAMIDE 10 MG/1
10 TABLET ORAL ONCE AS NEEDED
Status: DISCONTINUED | OUTPATIENT
Start: 2021-02-22 | End: 2021-02-24 | Stop reason: HOSPADM

## 2021-02-22 RX ORDER — OXYCODONE HYDROCHLORIDE 5 MG/1
5 TABLET ORAL EVERY 4 HOURS PRN
Status: DISCONTINUED | OUTPATIENT
Start: 2021-02-22 | End: 2021-02-24 | Stop reason: HOSPADM

## 2021-02-22 RX ORDER — PROMETHAZINE HYDROCHLORIDE 12.5 MG/1
12.5 SUPPOSITORY RECTAL ONCE AS NEEDED
Status: DISCONTINUED | OUTPATIENT
Start: 2021-02-22 | End: 2021-02-24 | Stop reason: HOSPADM

## 2021-02-22 RX ORDER — IBUPROFEN 600 MG/1
600 TABLET ORAL EVERY 6 HOURS
Status: DISCONTINUED | OUTPATIENT
Start: 2021-02-23 | End: 2021-02-24 | Stop reason: HOSPADM

## 2021-02-22 RX ORDER — FAMOTIDINE 10 MG/ML
20 INJECTION, SOLUTION INTRAVENOUS ONCE AS NEEDED
Status: DISCONTINUED | OUTPATIENT
Start: 2021-02-22 | End: 2021-02-22 | Stop reason: HOSPADM

## 2021-02-22 RX ORDER — LIDOCAINE HYDROCHLORIDE AND EPINEPHRINE 15; 5 MG/ML; UG/ML
INJECTION, SOLUTION EPIDURAL AS NEEDED
Status: DISCONTINUED | OUTPATIENT
Start: 2021-02-22 | End: 2021-02-22 | Stop reason: SURG

## 2021-02-22 RX ORDER — DOCUSATE SODIUM 100 MG/1
100 CAPSULE, LIQUID FILLED ORAL 2 TIMES DAILY
Status: DISCONTINUED | OUTPATIENT
Start: 2021-02-23 | End: 2021-02-24 | Stop reason: HOSPADM

## 2021-02-22 RX ORDER — ONDANSETRON 4 MG/1
4 TABLET, FILM COATED ORAL EVERY 6 HOURS PRN
Status: DISCONTINUED | OUTPATIENT
Start: 2021-02-22 | End: 2021-02-22 | Stop reason: HOSPADM

## 2021-02-22 RX ORDER — METOCLOPRAMIDE HYDROCHLORIDE 5 MG/ML
10 INJECTION INTRAMUSCULAR; INTRAVENOUS ONCE AS NEEDED
Status: DISCONTINUED | OUTPATIENT
Start: 2021-02-22 | End: 2021-02-22 | Stop reason: HOSPADM

## 2021-02-22 RX ORDER — PROMETHAZINE HYDROCHLORIDE 12.5 MG/1
12.5 SUPPOSITORY RECTAL EVERY 6 HOURS PRN
Status: DISCONTINUED | OUTPATIENT
Start: 2021-02-22 | End: 2021-02-22 | Stop reason: HOSPADM

## 2021-02-22 RX ORDER — ONDANSETRON 2 MG/ML
4 INJECTION INTRAMUSCULAR; INTRAVENOUS EVERY 6 HOURS PRN
Status: DISCONTINUED | OUTPATIENT
Start: 2021-02-22 | End: 2021-02-22 | Stop reason: HOSPADM

## 2021-02-22 RX ORDER — ONDANSETRON 2 MG/ML
4 INJECTION INTRAMUSCULAR; INTRAVENOUS EVERY 6 HOURS PRN
Status: DISCONTINUED | OUTPATIENT
Start: 2021-02-22 | End: 2021-02-24 | Stop reason: HOSPADM

## 2021-02-22 RX ORDER — ACETAMINOPHEN 325 MG/1
650 TABLET ORAL EVERY 4 HOURS
Status: DISCONTINUED | OUTPATIENT
Start: 2021-02-23 | End: 2021-02-24 | Stop reason: HOSPADM

## 2021-02-22 RX ORDER — ROPIVACAINE HYDROCHLORIDE 2 MG/ML
15 INJECTION, SOLUTION EPIDURAL; INFILTRATION; PERINEURAL CONTINUOUS
Status: DISCONTINUED | OUTPATIENT
Start: 2021-02-22 | End: 2021-02-24 | Stop reason: HOSPADM

## 2021-02-22 RX ORDER — OXYTOCIN-SODIUM CHLORIDE 0.9% IV SOLN 30 UNIT/500ML 30-0.9/5 UT/ML-%
85 SOLUTION INTRAVENOUS ONCE
Status: COMPLETED | OUTPATIENT
Start: 2021-02-22 | End: 2021-02-22

## 2021-02-22 RX ORDER — PROMETHAZINE HYDROCHLORIDE 12.5 MG/1
12.5 TABLET ORAL EVERY 6 HOURS PRN
Status: DISCONTINUED | OUTPATIENT
Start: 2021-02-22 | End: 2021-02-22 | Stop reason: HOSPADM

## 2021-02-22 RX ORDER — IBUPROFEN 600 MG/1
600 TABLET ORAL EVERY 6 HOURS PRN
Status: DISCONTINUED | OUTPATIENT
Start: 2021-02-22 | End: 2021-02-22 | Stop reason: HOSPADM

## 2021-02-22 RX ORDER — DIPHENHYDRAMINE HYDROCHLORIDE 50 MG/ML
12.5 INJECTION INTRAMUSCULAR; INTRAVENOUS EVERY 8 HOURS PRN
Status: DISCONTINUED | OUTPATIENT
Start: 2021-02-22 | End: 2021-02-22 | Stop reason: HOSPADM

## 2021-02-22 RX ORDER — PROMETHAZINE HYDROCHLORIDE 25 MG/1
25 TABLET ORAL ONCE AS NEEDED
Status: DISCONTINUED | OUTPATIENT
Start: 2021-02-22 | End: 2021-02-24 | Stop reason: HOSPADM

## 2021-02-22 RX ORDER — FAMOTIDINE 10 MG/ML
20 INJECTION, SOLUTION INTRAVENOUS ONCE
Status: COMPLETED | OUTPATIENT
Start: 2021-02-22 | End: 2021-02-22

## 2021-02-22 RX ADMIN — OXYTOCIN 85 ML/HR: 10 INJECTION INTRAVENOUS at 21:05

## 2021-02-22 RX ADMIN — ROPIVACAINE HYDROCHLORIDE: 2 INJECTION, SOLUTION EPIDURAL; INFILTRATION at 18:42

## 2021-02-22 RX ADMIN — ONDANSETRON 4 MG: 2 INJECTION INTRAMUSCULAR; INTRAVENOUS at 10:47

## 2021-02-22 RX ADMIN — DOCUSATE SODIUM 100 MG: 100 CAPSULE, LIQUID FILLED ORAL at 23:43

## 2021-02-22 RX ADMIN — ACETAMINOPHEN 650 MG: 325 TABLET ORAL at 23:43

## 2021-02-22 RX ADMIN — FAMOTIDINE 20 MG: 10 INJECTION, SOLUTION INTRAVENOUS at 10:32

## 2021-02-22 RX ADMIN — OXYTOCIN 2 MILLI-UNITS/MIN: 10 INJECTION INTRAVENOUS at 05:20

## 2021-02-22 RX ADMIN — LIDOCAINE HYDROCHLORIDE AND EPINEPHRINE 3 ML: 15; 5 INJECTION, SOLUTION EPIDURAL at 12:33

## 2021-02-22 RX ADMIN — ROPIVACAINE HYDROCHLORIDE 7.5 ML: 5 INJECTION, SOLUTION EPIDURAL; INFILTRATION; PERINEURAL at 18:43

## 2021-02-22 RX ADMIN — OXYTOCIN 650 ML/HR: 10 INJECTION INTRAVENOUS at 20:35

## 2021-02-22 RX ADMIN — FENTANYL CITRATE 100 MCG: 50 INJECTION, SOLUTION INTRAMUSCULAR; INTRAVENOUS at 12:36

## 2021-02-22 RX ADMIN — ROPIVACAINE HYDROCHLORIDE 9 ML: 5 INJECTION, SOLUTION EPIDURAL; INFILTRATION; PERINEURAL at 12:40

## 2021-02-22 RX ADMIN — ROPIVACAINE HYDROCHLORIDE 13 ML/HR: 2 INJECTION, SOLUTION EPIDURAL; INFILTRATION at 12:41

## 2021-02-22 RX ADMIN — SODIUM CHLORIDE, POTASSIUM CHLORIDE, SODIUM LACTATE AND CALCIUM CHLORIDE 125 ML/HR: 600; 310; 30; 20 INJECTION, SOLUTION INTRAVENOUS at 16:47

## 2021-02-22 RX ADMIN — SODIUM CHLORIDE, POTASSIUM CHLORIDE, SODIUM LACTATE AND CALCIUM CHLORIDE 999 ML/HR: 600; 310; 30; 20 INJECTION, SOLUTION INTRAVENOUS at 12:09

## 2021-02-22 RX ADMIN — BUTORPHANOL TARTRATE 2 MG: 2 INJECTION, SOLUTION INTRAMUSCULAR; INTRAVENOUS at 11:43

## 2021-02-22 RX ADMIN — SODIUM CHLORIDE, POTASSIUM CHLORIDE, SODIUM LACTATE AND CALCIUM CHLORIDE 125 ML/HR: 600; 310; 30; 20 INJECTION, SOLUTION INTRAVENOUS at 04:25

## 2021-02-22 RX ADMIN — Medication: at 23:43

## 2021-02-22 RX ADMIN — IBUPROFEN 600 MG: 600 TABLET, FILM COATED ORAL at 22:04

## 2021-02-22 NOTE — ANESTHESIA PROCEDURE NOTES
Labor Epidural      Patient reassessed immediately prior to procedure    Patient location during procedure: floor  Performed By  CRNA: Grace Du CRNA  Preanesthetic Checklist  Completed: patient identified, surgical consent, pre-op evaluation, timeout performed, IV checked, risks and benefits discussed and monitors and equipment checked  Prep:  Pt Position:sitting  Sterile Tech:cap, gloves, mask and sterile barrier  Prep:DuraPrep  Monitoring:blood pressure monitoring  Epidural Block Procedure:  Approach:midline  Guidance:palpation technique  Location:L4-L5  Needle Type:Tuohy  Needle Gauge:17 G  Loss of Resistance Medium: air  Loss of Resistance: 5cm  Cath Depth at skin:10 cm  Paresthesia: none  Aspiration:negative  Test Dose:negative  Number of Attempts: 1  Post Assessment:  Dressing:occlusive dressing applied and secured with tape  Pt Tolerance:patient tolerated the procedure well with no apparent complications  Complications:no

## 2021-02-22 NOTE — ANESTHESIA PREPROCEDURE EVALUATION
Anesthesia Evaluation     Patient summary reviewed and Nursing notes reviewed   NPO Solid Status: > 6 hours  NPO Liquid Status: < 2 hours           Airway   Mallampati: II  TM distance: >3 FB  Neck ROM: full  Dental      Pulmonary - negative pulmonary ROS   Cardiovascular - negative cardio ROS        Neuro/Psych  (+) psychiatric history Anxiety,     GI/Hepatic/Renal/Endo - negative ROS     Musculoskeletal (-) negative ROS    Abdominal    Substance History - negative use     OB/GYN    (+) Pregnant,         Other      history of cancer remission                    Anesthesia Plan    ASA 2     epidural       Anesthetic plan, all risks, benefits, and alternatives have been provided, discussed and informed consent has been obtained with: patient.    Plan discussed with attending.

## 2021-02-23 LAB
BASOPHILS # BLD AUTO: 0.04 10*3/MM3 (ref 0–0.2)
BASOPHILS NFR BLD AUTO: 0.2 % (ref 0–1.5)
DEPRECATED RDW RBC AUTO: 43.8 FL (ref 37–54)
EOSINOPHIL # BLD AUTO: 0.02 10*3/MM3 (ref 0–0.4)
EOSINOPHIL NFR BLD AUTO: 0.1 % (ref 0.3–6.2)
ERYTHROCYTE [DISTWIDTH] IN BLOOD BY AUTOMATED COUNT: 13.3 % (ref 12.3–15.4)
HCT VFR BLD AUTO: 27.6 % (ref 34–46.6)
HGB BLD-MCNC: 9 G/DL (ref 12–15.9)
IMM GRANULOCYTES # BLD AUTO: 0.28 10*3/MM3 (ref 0–0.05)
IMM GRANULOCYTES NFR BLD AUTO: 1.7 % (ref 0–0.5)
LYMPHOCYTES # BLD AUTO: 1.12 10*3/MM3 (ref 0.7–3.1)
LYMPHOCYTES NFR BLD AUTO: 6.9 % (ref 19.6–45.3)
MCH RBC QN AUTO: 29.9 PG (ref 26.6–33)
MCHC RBC AUTO-ENTMCNC: 32.6 G/DL (ref 31.5–35.7)
MCV RBC AUTO: 91.7 FL (ref 79–97)
MONOCYTES # BLD AUTO: 1.39 10*3/MM3 (ref 0.1–0.9)
MONOCYTES NFR BLD AUTO: 8.6 % (ref 5–12)
NEUTROPHILS NFR BLD AUTO: 13.33 10*3/MM3 (ref 1.7–7)
NEUTROPHILS NFR BLD AUTO: 82.5 % (ref 42.7–76)
NRBC BLD AUTO-RTO: 0 /100 WBC (ref 0–0.2)
PLATELET # BLD AUTO: 144 10*3/MM3 (ref 140–450)
PMV BLD AUTO: 10.1 FL (ref 6–12)
RBC # BLD AUTO: 3.01 10*6/MM3 (ref 3.77–5.28)
WBC # BLD AUTO: 16.18 10*3/MM3 (ref 3.4–10.8)

## 2021-02-23 PROCEDURE — 85025 COMPLETE CBC W/AUTO DIFF WBC: CPT | Performed by: OBSTETRICS & GYNECOLOGY

## 2021-02-23 PROCEDURE — 0503F POSTPARTUM CARE VISIT: CPT | Performed by: NURSE PRACTITIONER

## 2021-02-23 RX ORDER — HYDROCORTISONE 25 MG/G
CREAM TOPICAL AS NEEDED
Status: DISCONTINUED | OUTPATIENT
Start: 2021-02-23 | End: 2021-02-24 | Stop reason: HOSPADM

## 2021-02-23 RX ADMIN — HYDROCORTISONE 2.5%: 25 CREAM TOPICAL at 04:25

## 2021-02-23 RX ADMIN — WITCH HAZEL: 500 SOLUTION RECTAL; TOPICAL at 04:25

## 2021-02-23 RX ADMIN — DOCUSATE SODIUM 100 MG: 100 CAPSULE, LIQUID FILLED ORAL at 09:43

## 2021-02-23 RX ADMIN — IBUPROFEN 600 MG: 600 TABLET ORAL at 15:29

## 2021-02-23 RX ADMIN — IBUPROFEN 600 MG: 600 TABLET ORAL at 04:25

## 2021-02-23 RX ADMIN — ACETAMINOPHEN 650 MG: 325 TABLET ORAL at 19:58

## 2021-02-23 RX ADMIN — ACETAMINOPHEN 650 MG: 325 TABLET ORAL at 11:56

## 2021-02-23 RX ADMIN — ACETAMINOPHEN 650 MG: 325 TABLET ORAL at 08:43

## 2021-02-23 RX ADMIN — IBUPROFEN 600 MG: 600 TABLET ORAL at 21:53

## 2021-02-23 RX ADMIN — IBUPROFEN 600 MG: 600 TABLET ORAL at 09:43

## 2021-02-23 RX ADMIN — ACETAMINOPHEN 650 MG: 325 TABLET ORAL at 15:29

## 2021-02-23 RX ADMIN — DOCUSATE SODIUM 100 MG: 100 CAPSULE, LIQUID FILLED ORAL at 21:53

## 2021-02-23 RX ADMIN — ACETAMINOPHEN 650 MG: 325 TABLET ORAL at 04:25

## 2021-02-23 NOTE — ANESTHESIA POSTPROCEDURE EVALUATION
Patient: Anayeli Guevara    Procedure Summary     Date: 02/22/21 Room / Location:     Anesthesia Start: 1226 Anesthesia Stop: 2028    Procedure: LABOR ANALGESIA Diagnosis:     Scheduled Providers:  Provider: Christo Smith MD    Anesthesia Type: epidural ASA Status: 2          Anesthesia Type: epidural    Vitals  Vitals Value Taken Time   /52 02/23/21 0420   Temp 99 °F (37.2 °C) 02/23/21 0420   Pulse 81 02/23/21 0420   Resp 16 02/23/21 0420   SpO2             Post Anesthesia Care and Evaluation    Patient location during evaluation: bedside  Patient participation: complete - patient participated  Level of consciousness: awake  Pain score: 0  Pain management: satisfactory to patient  Airway patency: patent  Anesthetic complications: No anesthetic complications  PONV Status: none  Cardiovascular status: acceptable and hemodynamically stable  Respiratory status: acceptable  Hydration status: acceptable  Post Neuraxial Block status: Motor and sensory function returned to baseline and No signs or symptoms of PDPH

## 2021-02-24 VITALS
HEIGHT: 61 IN | DIASTOLIC BLOOD PRESSURE: 57 MMHG | BODY MASS INDEX: 30.21 KG/M2 | WEIGHT: 160 LBS | TEMPERATURE: 98.3 F | RESPIRATION RATE: 20 BRPM | HEART RATE: 88 BPM | SYSTOLIC BLOOD PRESSURE: 98 MMHG

## 2021-02-24 PROCEDURE — 0503F POSTPARTUM CARE VISIT: CPT | Performed by: NURSE PRACTITIONER

## 2021-02-24 RX ORDER — IBUPROFEN 600 MG/1
600 TABLET ORAL EVERY 6 HOURS
Qty: 30 TABLET | Refills: 0 | Status: SHIPPED | OUTPATIENT
Start: 2021-02-24 | End: 2022-06-21

## 2021-02-24 RX ADMIN — ACETAMINOPHEN 650 MG: 325 TABLET ORAL at 10:12

## 2021-02-24 RX ADMIN — ACETAMINOPHEN 650 MG: 325 TABLET ORAL at 00:00

## 2021-02-24 RX ADMIN — WITCH HAZEL: 500 SOLUTION RECTAL; TOPICAL at 10:12

## 2021-02-24 RX ADMIN — IBUPROFEN 600 MG: 600 TABLET ORAL at 11:39

## 2021-02-24 RX ADMIN — IBUPROFEN 600 MG: 600 TABLET ORAL at 05:47

## 2021-02-24 RX ADMIN — DOCUSATE SODIUM 100 MG: 100 CAPSULE, LIQUID FILLED ORAL at 08:36

## 2021-02-24 RX ADMIN — ACETAMINOPHEN 650 MG: 325 TABLET ORAL at 05:47

## 2021-04-07 ENCOUNTER — POSTPARTUM VISIT (OUTPATIENT)
Dept: OBSTETRICS AND GYNECOLOGY | Facility: CLINIC | Age: 31
End: 2021-04-07

## 2021-04-07 VITALS
HEIGHT: 61 IN | WEIGHT: 132 LBS | SYSTOLIC BLOOD PRESSURE: 102 MMHG | BODY MASS INDEX: 24.92 KG/M2 | DIASTOLIC BLOOD PRESSURE: 66 MMHG

## 2021-04-07 PROCEDURE — 0503F POSTPARTUM CARE VISIT: CPT | Performed by: OBSTETRICS & GYNECOLOGY

## 2021-04-07 NOTE — PROGRESS NOTES
Chief Complaint   Patient presents with   • Postpartum Care       6 Week Postpartum Visit         Anayeli Guevara is a 30 y.o.  s/p Vaginal delivery.  Problems with laceration or tear reported: 2nd degree. at 39.4 weeks on 21, who presents today for a 6 week postpartum check.      At the time of delivery were you diagnosed with any of the following: bleeding with Dexter bulb - placed incorrectly, nuchal cord x 2 noted at delivery, baby swallowed amniotic fluid, resp therapy     Patient denies postpartum depression.  Patient describes bleeding as absent.  Patient is breast feeding.  Desires contraceptive methods: Condoms for contraception.  Some leaking urine and constipation. She wants clearance to start pelvic floor therapy    Postpartum Depression Screening Questionnaire: 6, no treatment indicated.  Baby Name: Rip  Baby Weight: [id number]  Baby Discharged: Discharged with Mom  Delivering Physician: Dr. Estrada    Last Completed Pap Smear       Status Date      PAP SMEAR Patient-Reported (Performed Externally) 2019 normal; no history of abnormals        Is the patient due for a pap today? No Pap with PCP - every 3 years, no hx of abnormal pap     The additional following portions of the patient's history were reviewed and updated as appropriate: allergies, current medications, past family history, past medical history, past social history, past surgical history and problem list.    Review of Systems   Constitutional: Negative.    HENT: Negative.    Eyes: Negative.    Respiratory: Negative.    Cardiovascular: Negative.    Gastrointestinal: Negative.    Endocrine: Negative.    Genitourinary: Negative.    Musculoskeletal: Negative.    Skin: Negative.    Allergic/Immunologic: Negative.    Neurological: Negative.    Hematological: Negative.    Psychiatric/Behavioral: Negative.      All other systems reviewed and are negative.     I have reviewed and agree with the HPI, ROS, and historical information as  "entered above. Gilmar Estrada MD    /66   Ht 154.9 cm (61\")   Wt 59.9 kg (132 lb)   LMP 2020 (Exact Date)   BMI 24.94 kg/m²     Physical Exam  Episiotomy healing well bimanual uterus normal size nontender      Assessment and Plan    Problem List Items Addressed This Visit        Gravid and     Postpartum follow-up - Primary          1. S/p Vaginal delivery, 6 weeks postpartum.  Doing well.    2. Return to normal physical activity.  No pelvic restrictions.   3. Contraception: contraceptive methods: Condoms  4. Follow up for Annual.     Gilmar Estrada MD  2021  "

## 2022-06-21 ENCOUNTER — OFFICE VISIT (OUTPATIENT)
Dept: OBSTETRICS AND GYNECOLOGY | Facility: CLINIC | Age: 32
End: 2022-06-21

## 2022-06-21 VITALS
DIASTOLIC BLOOD PRESSURE: 60 MMHG | BODY MASS INDEX: 20.28 KG/M2 | WEIGHT: 107.4 LBS | HEIGHT: 61 IN | SYSTOLIC BLOOD PRESSURE: 115 MMHG

## 2022-06-21 DIAGNOSIS — Z01.419 WOMEN'S ANNUAL ROUTINE GYNECOLOGICAL EXAMINATION: Primary | ICD-10-CM

## 2022-06-21 PROBLEM — Z34.90 PREGNANCY: Status: RESOLVED | Noted: 2021-02-21 | Resolved: 2022-06-21

## 2022-06-21 PROBLEM — Z3A.34 34 WEEKS GESTATION OF PREGNANCY: Status: RESOLVED | Noted: 2021-01-13 | Resolved: 2022-06-21

## 2022-06-21 PROBLEM — Z3A.37 37 WEEKS GESTATION OF PREGNANCY: Status: RESOLVED | Noted: 2021-02-10 | Resolved: 2022-06-21

## 2022-06-21 PROCEDURE — 99395 PREV VISIT EST AGE 18-39: CPT | Performed by: OBSTETRICS & GYNECOLOGY

## 2022-06-21 NOTE — PROGRESS NOTES
GYN Annual Exam     CC - Here for annual exam.        HPI  Anayeli Guevara is a 31 y.o. female, , who presents for annual well woman exam. Patient's last menstrual period was 2022..  Periods are regular every 25-35 days, lasting 5 days. .  Dysmenorrhea:moderate, occurring premenstrually and first 1-2 days of flow.  Patient reports problems with: none.  Since her last visit the patient was diagnosed with shingles, paitent been treated and has no further issues. Partner Status: Marital Status: .  She is sexually active. She has not had new partners since her last STD testing. . STD testing recommendations have been explained to the patient and she does not desire STD testing.  Her daughter is 16 months old, and she may think about another baby in next year or so.     Additional OB/GYN History   Current contraception: contraceptive methods: Condoms  Desires to: do not start contraception  Last Pap : 2019. Result: Negative   Last Completed Pap Smear     This patient has no relevant Health Maintenance data.        History of abnormal Pap smear: no  Family history of uterine, colon, breast, or ovarian cancer: no  Performs monthly Self-Breast Exam: occasionally   Exercises Regularly:yes  Feelings of Anxiety or Depression: no  Tobacco Usage?: No   OB History        2    Para   1    Term   1            AB   1    Living   1       SAB   1    IAB        Ectopic        Molar        Multiple   0    Live Births   1                Health Maintenance   Topic Date Due   • Annual Gynecologic Pelvic and Breast Exam  Never done   • ANNUAL PHYSICAL  Never done   • PAP SMEAR  2022   • COVID-19 Vaccine (2 - Pfizer series) 2022   • INFLUENZA VACCINE  10/01/2022   • TDAP/TD VACCINES (2 - Td or Tdap) 2030   • HEPATITIS C SCREENING  Completed   • Pneumococcal Vaccine 0-64  Aged Out       The additional following portions of the patient's history were reviewed and updated as appropriate:  "allergies, current medications, past family history, past medical history, past social history, past surgical history and problem list.    Review of Systems   Constitutional: Negative.    HENT: Negative.    Eyes: Negative.    Respiratory: Negative.    Cardiovascular: Negative.    Gastrointestinal: Negative.    Endocrine: Negative.    Genitourinary: Negative.    Musculoskeletal: Negative.    Skin: Negative.    Allergic/Immunologic: Negative.    Neurological: Negative.    Hematological: Negative.    Psychiatric/Behavioral: Negative.          I have reviewed and agree with the HPI, ROS, and historical information as entered above. Rae Shin MD    Objective   /60   Ht 154.9 cm (61\")   Wt 48.7 kg (107 lb 6.4 oz)   LMP 06/19/2022   BMI 20.29 kg/m²     Physical Exam  Vitals and nursing note reviewed. Exam conducted with a chaperone present.   Constitutional:       Appearance: She is well-developed.   HENT:      Head: Normocephalic and atraumatic.   Eyes:      Pupils: Pupils are equal, round, and reactive to light.   Neck:      Thyroid: No thyroid mass or thyromegaly.   Pulmonary:      Effort: Pulmonary effort is normal. No retractions.   Chest:      Chest wall: No mass.   Breasts:      Right: Normal. No mass, nipple discharge, skin change or tenderness.      Left: Normal. No mass, nipple discharge, skin change or tenderness.       Abdominal:      General: Bowel sounds are normal.      Palpations: Abdomen is soft. Abdomen is not rigid. There is no mass.      Tenderness: There is no abdominal tenderness. There is no guarding.      Hernia: No hernia is present. There is no hernia in the left inguinal area or right inguinal area.   Genitourinary:     Exam position: Lithotomy position.      Pubic Area: No rash.       Labia:         Right: No rash, tenderness or lesion.         Left: No rash, tenderness or lesion.       Urethra: No urethral pain or urethral swelling.      Vagina: Normal. No vaginal discharge or " lesions.      Cervix: No cervical motion tenderness, discharge, lesion or cervical bleeding.      Uterus: Normal. Not enlarged, not fixed and not tender.       Adnexa:         Right: No mass, tenderness or fullness.          Left: No mass, tenderness or fullness.        Rectum: No external hemorrhoid.   Musculoskeletal:      Cervical back: Normal range of motion. No muscular tenderness.      Right lower leg: No edema.      Left lower leg: No edema.   Skin:     General: Skin is warm and dry.   Neurological:      Mental Status: She is alert and oriented to person, place, and time.      Motor: Motor function is intact.   Psychiatric:         Mood and Affect: Mood and affect normal.         Behavior: Behavior normal.            Assessment and Plan    Problem List Items Addressed This Visit    None     Visit Diagnoses     Women's annual routine gynecological examination    -  Primary    Relevant Orders    LIQUID-BASED PAP SMEAR, [facility] ([facility])          1. GYN annual well woman exam.   2. Preconceptual Counseling.  Discussed timed intercourse, regular cycles, prenatal vitamins, and when to call.  3. Reviewed monthly self breast exams.  Instructed to call with lumps, pain, or breast discharge.    4. Reviewed exercise as a preventative health measures.   5. Recommended use of Vitamin D replacement and getting adequate calcium in her diet. (1500mg)  6. Reccommended Flu Vaccine in Fall of each year.  7. RTC in 1 year or PRN with problems  Return in about 1 year (around 6/21/2023) for Annual physical.      Rae Shin MD  06/21/2022

## 2022-06-24 LAB — REF LAB TEST METHOD: NORMAL

## 2023-09-05 ENCOUNTER — OFFICE VISIT (OUTPATIENT)
Dept: OBSTETRICS AND GYNECOLOGY | Facility: CLINIC | Age: 33
End: 2023-09-05
Payer: COMMERCIAL

## 2023-09-05 VITALS — DIASTOLIC BLOOD PRESSURE: 72 MMHG | SYSTOLIC BLOOD PRESSURE: 108 MMHG

## 2023-09-05 DIAGNOSIS — R10.2 PELVIC PAIN: Primary | ICD-10-CM

## 2023-09-05 DIAGNOSIS — Z01.419 WOMEN'S ANNUAL ROUTINE GYNECOLOGICAL EXAMINATION: ICD-10-CM

## 2023-09-05 NOTE — PROGRESS NOTES
Gynecologic Annual Exam Note        Gynecologic Exam        Subjective     HPI  Anayeli Guevara is a 32 y.o.  female who presents for annual well woman exam as a established patient. There were no changes to her medical or surgical history since her last visit.. Patient reports problems with:  Patient reports a very sharp pain in her right ovary area, and sometimes pain on both sides. Patient reports her pain as a 6/10 that can take her breath away and double her over. Patient reports that the pain started in January when she was starting to wean breastfeeding but has not improved. Patient reports hx of ovarian cyst in  which burst but denies having any since then. Still nursing at night. The pain happens around every other week or so but she cannot correlate any specific causes or timing.  Patient's last menstrual period was 2023 (exact date).. Her periods occur every 25-35 days , lasting 5 days. The flow is moderate.. She reports dysmenorrhea is mild, occurring premenstrually. Marital Status: .  She is sexually active. She has not had new partners.. STD testing recommendations have been explained to the patient and she does not desire STD testing.    Her mom had a hysterectomy for endometriosis. She had a vaginal delivery. She states before pregnancy and breastfeeding her cycles were normal with mild dysmenorrhea.  She states they aren't thinking of conceiving soon but are not interested in starting birth control either.     Additional OB/GYN History   Current contraception: contraceptive methods: None  Desires to: do not start contraception  Thromboembolic Disease: none  Age of menarche: 11    History of STD: No    Last Pap : 22. Results: negative. HPV: negative.   Last Completed Pap Smear            PAP SMEAR (Every 3 Years) Next due on 2022  LIQUID-BASED PAP SMEAR, [facility] ([facility])    2019  Patient-Reported (Performed Externally) - normal; no  Yes ok to use hydrocortisone once or twice a day as well as using otc cream - cerave, lubriderm, eucerin, or similar - 3-4 times per day    history of abnormals                     History of abnormal Pap smear: no  Gardasil status:refuses  Family history of uterine, colon, breast, or ovarian cancer: yes - ovarian CA paternal grandmother  Performs monthly Self-Breast Exam: no  Exercises Regularly:yes  Feelings of Anxiety or Depression: no  Tobacco Usage?: No       Current Outpatient Medications:     Ferrous Sulfate Dried (High Potency Iron) 65 MG tablet, , Disp: , Rfl:     prenatal vitamin (prenatal, CLASSIC, vitamin) tablet, Take  by mouth Daily., Disp: , Rfl:      Patient denies the need for medication refills today.    OB History          2    Para   1    Term   1            AB   1    Living   1         SAB   1    IAB        Ectopic        Molar        Multiple   0    Live Births   1                Health Maintenance   Topic Date Due    MAMMOGRAM  Never done    ANNUAL PHYSICAL  Never done    COVID-19 Vaccine (3 - Pfizer series) 2022    Annual Gynecologic Pelvic and Breast Exam  2023    INFLUENZA VACCINE  10/01/2023    PAP SMEAR  2025    TDAP/TD VACCINES (2 - Td or Tdap) 2030    HEPATITIS C SCREENING  Completed    Pneumococcal Vaccine 0-64  Aged Out       Past Medical History:   Diagnosis Date    Anxiety     Basal cell carcinoma     Nasal surgery to remove    Blood type, Rh negative     Shingles         Past Surgical History:   Procedure Laterality Date    BASAL CELL CARCINOMA EXCISION  2015    of nose - skin graft performed as well    WISDOM TOOTH EXTRACTION         The additional following portions of the patient's history were reviewed and updated as appropriate: allergies, current medications, past family history, past medical history, past social history, past surgical history, and problem list.    Review of Systems   Constitutional: Negative.    Respiratory: Negative.     Cardiovascular: Negative.    Gastrointestinal: Negative.    Genitourinary:  Positive for pelvic pain (pain in R ovary area).    Psychiatric/Behavioral: Negative.     All other systems reviewed and are negative.      I have reviewed and agree with the HPI, ROS, and historical information as entered above. Jessica Julien, APRN          Objective   /72   LMP 09/03/2023 (Exact Date)   Breastfeeding Yes     Physical Exam  Vitals and nursing note reviewed. Exam conducted with a chaperone present.   Constitutional:       General: She is not in acute distress.     Appearance: Normal appearance. She is well-developed and normal weight. She is not ill-appearing.   Neck:      Thyroid: No thyroid mass or thyromegaly.   Pulmonary:      Effort: Pulmonary effort is normal. No respiratory distress or retractions.   Chest:      Chest wall: No mass.   Breasts:     Right: Normal. No mass, nipple discharge, skin change or tenderness.      Left: Normal. No mass, nipple discharge, skin change or tenderness.   Abdominal:      General: There is no distension.      Palpations: Abdomen is soft. Abdomen is not rigid. There is no mass.      Tenderness: There is no abdominal tenderness. There is no guarding or rebound.      Hernia: No hernia is present.   Genitourinary:     General: Normal vulva.      Exam position: Lithotomy position.      Labia:         Right: No rash, tenderness or lesion.         Left: No rash, tenderness or lesion.       Vagina: Normal. No vaginal discharge or lesions.      Cervix: Normal. No cervical motion tenderness.      Uterus: Normal. Not enlarged, not fixed and not tender.       Adnexa: Right adnexa normal and left adnexa normal.        Right: No mass or tenderness.          Left: No mass or tenderness.        Rectum: Normal. No external hemorrhoid.   Musculoskeletal:      Cervical back: No muscular tenderness.   Skin:     General: Skin is warm and dry.   Neurological:      Mental Status: She is alert and oriented to person, place, and time.   Psychiatric:         Mood and Affect: Mood normal.         Behavior: Behavior normal.         Assessment and Plan    Problem List Items Addressed This Visit    None  Visit Diagnoses       Pelvic pain    -  Primary    Relevant Orders    US Non-ob Transvaginal    Women's annual routine gynecological examination                GYN annual well woman exam.   Reviewed pap guidelines. Up to date until 2025.  Will obtain TVUS to assess for causes of pain. Is okay with waiting around 4-6 wks instead of having done at the hospital.  Reviewed monthly self breast exams.  Instructed to call with lumps, pain, or breast discharge.    Return in about 4 weeks (around 10/3/2023) for ultrasound.      Jessica Victoria, APRN  09/05/2023

## 2023-10-12 ENCOUNTER — OFFICE VISIT (OUTPATIENT)
Dept: OBSTETRICS AND GYNECOLOGY | Facility: CLINIC | Age: 33
End: 2023-10-12
Payer: COMMERCIAL

## 2023-10-12 VITALS
SYSTOLIC BLOOD PRESSURE: 116 MMHG | WEIGHT: 109 LBS | BODY MASS INDEX: 20.58 KG/M2 | DIASTOLIC BLOOD PRESSURE: 72 MMHG | HEIGHT: 61 IN

## 2023-10-12 DIAGNOSIS — R10.2 PELVIC PAIN: Primary | ICD-10-CM

## 2023-10-12 PROCEDURE — 99213 OFFICE O/P EST LOW 20 MIN: CPT | Performed by: OBSTETRICS & GYNECOLOGY

## 2023-10-12 NOTE — PROGRESS NOTES
Follow-up (USG/Pelvic pain )        HPI  Anayeli Guevara is a 32 y.o. female, , who presents for initial evaluation evaluation of pelvic pain.      The pain is located in the right lower quadrant, but sometimes pain is bilaterally and it does not radiate. She has experienced this problem for 10 months. She describes the pain as sharp and stabbing and it occurs every other week and worse during ovulation. She states that pain is worsening. She rates her pain score as a  6-7 /10 when at its worst. Patient notes aggravating factors include none and alleviating factors are rest and heating pad.  The patient reports additional symptoms as none.  The patient has not previously been evaluated for pelvic pain. Patient reports that the pain started in January when she was starting to wean breastfeeding, but has not improved. Patient reports hx of ovarian cyst in  which burst but denies having any since then.The patient is sexually active. She has not had new partners..    Did the patient have u/s today? Yes.  Findings showed normal uterus, collapsing CL, otherwise normal US.  I have personally evaluated the U/S and agree with the findings. Rae Shin MD    Her last LMP was Patient's last menstrual period was 2023..  Periods are regular every 28-30 days, lasting 5 days.  Dysmenorrhea:mild for the first 1-2 days.    Problems with GI: no  History of urinary disease: no  Concern for Anxiety or Depression: no  Exercises Regularly: yes  Tobacco Usage?: No     Additional OB/GYN History   Last Pap :   Last Completed Pap Smear            PAP SMEAR (Every 3 Years) Next due on 2022  LIQUID-BASED PAP SMEAR, [facility] ([facility])    2019  Patient-Reported (Performed Externally) - normal; no history of abnormals                    OB History          2    Para   1    Term   1            AB   1    Living   1         SAB   1    IAB        Ectopic        Molar        Multiple  "  0    Live Births   1                  Current Outpatient Medications:     Ferrous Sulfate Dried (High Potency Iron) 65 MG tablet, , Disp: , Rfl:     prenatal vitamin (prenatal, CLASSIC, vitamin) tablet, Take  by mouth Daily., Disp: , Rfl:      Past Medical History:   Diagnosis Date    Anxiety     Basal cell carcinoma 2015    Nasal surgery to remove    Blood type, Rh negative     Ovarian cyst 2010    PMS (premenstrual syndrome) 2003    PONV (postoperative nausea and vomiting)     Shingles 2021    Varicella 1995        Past Surgical History:   Procedure Laterality Date    BASAL CELL CARCINOMA EXCISION  06/2015    of nose - skin graft performed as well    WISDOM TOOTH EXTRACTION         The additional following portions of the patient's history were reviewed and updated as appropriate: allergies, current medications, past family history, past medical history, past social history, past surgical history, and problem list.      Review of Systems   Constitutional: Negative.    HENT: Negative.     Eyes: Negative.    Respiratory: Negative.     Cardiovascular: Negative.    Gastrointestinal: Negative.    Endocrine: Negative.    Genitourinary:  Positive for pelvic pain.   Musculoskeletal: Negative.    Skin: Negative.    Allergic/Immunologic: Negative.    Neurological: Negative.    Hematological: Negative.    Psychiatric/Behavioral: Negative.       All other systems reviewed and are negative.     I have reviewed and agree with the HPI, ROS, and historical information as entered above. Rae Shin MD      Objective   /72   Ht 154.9 cm (61\")   Wt 49.4 kg (109 lb)   LMP 09/28/2023   BMI 20.60 kg/mý     Physical Exam  Vitals and nursing note reviewed.   Constitutional:       Appearance: Normal appearance.   HENT:      Head: Normocephalic and atraumatic.   Eyes:      General: No scleral icterus.     Pupils: Pupils are equal, round, and reactive to light.   Pulmonary:      Effort: Pulmonary effort is normal.      Breath " sounds: Normal breath sounds.   Abdominal:      General: Bowel sounds are normal. There is no distension.      Palpations: Abdomen is soft.      Tenderness: There is no abdominal tenderness.   Musculoskeletal:         General: Normal range of motion.      Cervical back: Normal range of motion and neck supple.      Right lower leg: No edema.      Left lower leg: No edema.   Skin:     General: Skin is warm and dry.   Neurological:      General: No focal deficit present.      Mental Status: She is alert and oriented to person, place, and time.   Psychiatric:         Mood and Affect: Mood normal.         Behavior: Behavior normal. Behavior is cooperative.         Assessment & Plan     Assessment and Plan    Problem List Items Addressed This Visit    None  Visit Diagnoses       Pelvic pain    -  Primary            US today wnl, she is also undergoing eval with PCP for hematuria, and possible autoimmune issues. Discussed endo as a potential cause of pelvic pain and discussed dx lap, vs hormonal suppression. She had trouble with kyaw in the past, but maybe an iud would be an option. She currently doesn't think she will TTC again. She will call if desires to sched dx lap or is interested in ocps, progestins, iud.   I spent 20 minutes caring for Anayeli on this date of service. This time includes time spent by me in the following activities:preparing for the visit, reviewing tests, obtaining and/or reviewing a separately obtained history, ordering medications, tests, or procedures, and documenting information in the medical record    No follow-ups on file.    Rae Shin MD  10/12/2023

## 2024-02-15 ENCOUNTER — OFFICE VISIT (OUTPATIENT)
Dept: GASTROENTEROLOGY | Facility: CLINIC | Age: 34
End: 2024-02-15
Payer: COMMERCIAL

## 2024-02-15 VITALS
HEART RATE: 89 BPM | HEIGHT: 61 IN | WEIGHT: 108.4 LBS | DIASTOLIC BLOOD PRESSURE: 70 MMHG | SYSTOLIC BLOOD PRESSURE: 110 MMHG | OXYGEN SATURATION: 98 % | BODY MASS INDEX: 20.47 KG/M2

## 2024-02-15 DIAGNOSIS — R63.4 WEIGHT LOSS: Primary | ICD-10-CM

## 2024-02-15 DIAGNOSIS — R10.9 ABDOMINAL PAIN, UNSPECIFIED ABDOMINAL LOCATION: ICD-10-CM

## 2024-02-15 DIAGNOSIS — R19.7 DIARRHEA, UNSPECIFIED TYPE: ICD-10-CM

## 2024-02-15 DIAGNOSIS — R11.0 NAUSEA: ICD-10-CM

## 2024-02-15 DIAGNOSIS — R14.0 BLOATING: ICD-10-CM

## 2024-02-15 RX ORDER — VITAMIN E 268 MG
400 CAPSULE ORAL DAILY
COMMUNITY

## 2024-02-15 RX ORDER — MELATONIN
1000 DAILY
COMMUNITY

## 2024-02-15 RX ORDER — LANOLIN ALCOHOL/MO/W.PET/CERES
1000 CREAM (GRAM) TOPICAL DAILY
COMMUNITY

## 2024-02-19 ENCOUNTER — TELEPHONE (OUTPATIENT)
Dept: GASTROENTEROLOGY | Facility: CLINIC | Age: 34
End: 2024-02-19
Payer: COMMERCIAL

## 2024-02-23 ENCOUNTER — TELEPHONE (OUTPATIENT)
Dept: GASTROENTEROLOGY | Facility: CLINIC | Age: 34
End: 2024-02-23
Payer: COMMERCIAL

## 2024-05-21 ENCOUNTER — OFFICE VISIT (OUTPATIENT)
Dept: ENDOCRINOLOGY | Facility: CLINIC | Age: 34
End: 2024-05-21
Payer: COMMERCIAL

## 2024-05-21 VITALS
WEIGHT: 109 LBS | DIASTOLIC BLOOD PRESSURE: 70 MMHG | OXYGEN SATURATION: 98 % | BODY MASS INDEX: 20.58 KG/M2 | HEART RATE: 97 BPM | SYSTOLIC BLOOD PRESSURE: 108 MMHG | HEIGHT: 61 IN

## 2024-05-21 DIAGNOSIS — R11.0 NAUSEA: ICD-10-CM

## 2024-05-21 DIAGNOSIS — R53.1 WEAKNESS: ICD-10-CM

## 2024-05-21 DIAGNOSIS — R76.8 ANTI-TPO ANTIBODIES PRESENT: Primary | ICD-10-CM

## 2024-05-21 DIAGNOSIS — R79.89 ELEVATED FERRITIN: ICD-10-CM

## 2024-05-21 PROCEDURE — 84443 ASSAY THYROID STIM HORMONE: CPT | Performed by: INTERNAL MEDICINE

## 2024-05-21 PROCEDURE — 84439 ASSAY OF FREE THYROXINE: CPT | Performed by: INTERNAL MEDICINE

## 2024-05-21 PROCEDURE — 82533 TOTAL CORTISOL: CPT | Performed by: INTERNAL MEDICINE

## 2024-05-21 NOTE — PROGRESS NOTES
Chief Complaint   Patient presents with    Thyroid Problem     Positive thyroid Antibodies        Referring Provider  Juan Jose Pino MD     HPI   Anayeli Guevara is a 33 y.o. female had concerns including Thyroid Problem (Positive thyroid Antibodies).     New pt referred for TPO antibodies.   She developed pelvic pain and fatigue and OB screened for endometriosis.   Due to fatigue/joint pains she was evaluated for Rheumatologic conditions. Question of Ankylosing Spondylitis and positive for Sjogrens.     Has also seen GI for nausea with no significant findings. Suggested possible scope.     Has nausea, lightheadedness, extreme heat sensitivity (vomits from heat).   Has a 3 year old. Not planning for more children.     Fatigue is her primary issue. Also with weakness. Was in [place] recently and at the end of the day was essentially unable to stand. No mention of neuro eval at this time.    Was losing weight but has been able to maintain her weight more recently with exercise.   Has orthostasis as well.   Now with tinnitus also, feels like the ear is clogged. Has seasonal allergies which have improved since pregnancy 2021.     Sleep is improved recently. Getting at least 8 hrs.     Tries to eat healthy. Has cut out basically all processed foods/dyes/organic. Has felt some improvement since changing her diet.   Tried AIP diet in the past but noted no change. Celiac has been ruled out.     Sister has hyperthyroidism.     No recent steroid prescriptions.     Past Medical History:   Diagnosis Date    Anxiety     Basal cell carcinoma 2015    Nasal surgery to remove    Blood type, Rh negative     Lactose intolerance     Ovarian cyst 2010    PMS (premenstrual syndrome) 2003    PONV (postoperative nausea and vomiting)     Shingles 2021    Varicella 1995     Past Surgical History:   Procedure Laterality Date    BASAL CELL CARCINOMA EXCISION  06/2015    of nose - skin graft performed as well    WISDOM TOOTH EXTRACTION       How Severe Are Your Spot(S)?: mild   Family History   Problem Relation Age of Onset    Hypertension Mother     Hyperthyroidism Sister     Ovarian cancer Paternal Grandmother     Meniere's disease Maternal Grandmother     Osteoporosis Maternal Grandmother     Coronary artery disease Paternal Grandfather     Pulmonary embolism Paternal Uncle     Breast cancer Neg Hx     Uterine cancer Neg Hx     Colon cancer Neg Hx       Social History     Socioeconomic History    Marital status:    Tobacco Use    Smoking status: Never    Smokeless tobacco: Never   Vaping Use    Vaping status: Never Used   Substance and Sexual Activity    Alcohol use: Not Currently     Comment: FORMER    Drug use: Never    Sexual activity: Yes     Partners: Male     Birth control/protection: Condom, None      Allergies   Allergen Reactions    Sulfa Antibiotics Hives and Swelling    Latex Rash     From bandages mainly  Whelps on place of contact      Current Outpatient Medications on File Prior to Visit   Medication Sig Dispense Refill    Cholecalciferol 25 MCG (1000 UT) tablet Take 1 tablet by mouth Daily.      Omega-3 Fatty Acids (FISH OIL HIGH POTENCY PO) Take  by mouth.      Probiotic Product (PROBIOTIC DAILY PO) Take  by mouth.      vitamin B-12 (CYANOCOBALAMIN) 1000 MCG tablet Take 1 tablet by mouth Daily.      VITAMIN E 400 UNIT capsule Take 1 capsule by mouth Daily.       No current facility-administered medications on file prior to visit.        Review of Systems   Constitutional:  Positive for fatigue and unexpected weight loss.   HENT:  Positive for tinnitus.    Eyes:  Positive for itching.   Respiratory:  Positive for shortness of breath.    Gastrointestinal:  Positive for nausea.   Endocrine: Positive for heat intolerance and polydipsia.   Genitourinary:  Positive for pelvic pain.   Musculoskeletal:  Positive for arthralgias, back pain, myalgias, neck pain and neck stiffness.   Skin:  Positive for rash.   Allergic/Immunologic: Positive for environmental allergies.  What Is The Reason For Today's Visit?: Full Body Skin Examination "  Neurological:  Positive for dizziness, light-headedness and numbness.   Hematological:  Bruises/bleeds easily.   Psychiatric/Behavioral:  Positive for decreased concentration.         /70 (BP Location: Right arm, Patient Position: Sitting, Cuff Size: Adult)   Pulse 97   Ht 154.9 cm (61\")   Wt 49.4 kg (109 lb)   SpO2 98%   Breastfeeding No   BMI 20.60 kg/m²      Physical Exam    Constitutional:  well developed; well nourished  no acute distress   ENT/Thyroid: no thyromegaly  no palpable nodules   Eyes: EOM intact  Conjunctiva: clear   Respiratory:  breathing is unlabored  clear to auscultation bilaterally   Cardiovascular:  regular rate and rhythm, S1, S2 normal, no murmur, click, rub or gallop   Chest:  Not performed.   Abdomen: Not performed.   : Not performed.   Musculoskeletal: negative findings:  ROM of all joints is normal, no deformities present   Skin: dry and warm   Neuro: normal without focal findings and mental status, speech normal, alert and oriented x3   Psych: oriented to time, place and person, mood and affect are within normal limits     Labs/Imaging    CBC w/DIFF   Lab Results   Component Value Date    WBC 16.18 (H) 02/23/2021    RBC 3.01 (L) 02/23/2021    HGB 9.0 (L) 02/23/2021    HCT 27.6 (L) 02/23/2021    MCV 91.7 02/23/2021    MCH 29.9 02/23/2021    MCHC 32.6 02/23/2021    RDW 13.3 02/23/2021    RDWSD 43.8 02/23/2021    MPV 10.1 02/23/2021     02/23/2021    NEUTRORELPCT 82.5 (H) 02/23/2021    LYMPHORELPCT 6.9 (L) 02/23/2021    MONORELPCT 8.6 02/23/2021    EOSRELPCT 0.1 (L) 02/23/2021    BASORELPCT 0.2 02/23/2021    AUTOIGPER 1.7 (H) 02/23/2021    NEUTROABS 13.33 (H) 02/23/2021    LYMPHSABS 1.12 02/23/2021    MONOSABS 1.39 (H) 02/23/2021    EOSABS 0.02 02/23/2021    BASOSABS 0.04 02/23/2021    AUTOIGNUM 0.28 (H) 02/23/2021    NRBC 0.0 02/23/2021 1/11/2024 thyroglobulin antibody 1.2 with upper limit 0.9, TPO antibody 233, TSH 2.15, total T4 8.5, CMP within normal limits, " What Is The Reason For Today's Visit? (Being Monitored For X): concerning skin lesions on an annual basis CBC within normal limits,   12/13/2023 BETZAIDA positive, 1: 160, ferritin 258    Assessment and Plan    Diagnoses and all orders for this visit:    1. Anti-TPO antibodies present (Primary)  Pt has positive TPO antibodies in the setting of normal thyroid function.  She does not have thyroiditis or hypothyroidism.  The presence of TPO antibodies is seen in up to 25-30% of the normal population, and can predispose patients to the development of hypothyroidism in the future. However, some patients may never develop hypothyroidism.  There is no treatment for TPO antibodies without hypothyroidism. Antibody levels do not need to be monitored.    With previously normal TSH and free T4, her symptoms are not likely to be related to the thyroid unless she has developed early hypothyroidism.   Recheck levels today. Discussed the option to try low dose levothyroxine if TSH is upper normal or elevated due to multitude of symptoms.   She has no future plans for pregnancy (in which case TSH target < 2.5 due to elevated antibodies).  -     T4, Free  -     TSH    2. Nausea  Normal electrolytes and pt is not hyperpigmented, but weight loss, nausea, orthostasis suggest possible adrenal insufficiency. Check cortisol. If low - stim test will be done.  -     Cortisol    3. Elevated ferritin  Needs this rechecked - follow-up with PCP.     4. Weakness  Pending eval as above, consider neurologic eval due to extreme progressive weakness with activity.      Return in about 6 months (around 11/21/2024) for next scheduled follow up. The patient was instructed to contact the clinic with any interval questions or concerns.    Electronically signed by: Carri Cunningham DO    Endocrinologist    Please note that portions of this note were completed with a voice recognition program.

## 2024-05-22 LAB
CORTIS SERPL-MCNC: 6.47 MCG/DL
T4 FREE SERPL-MCNC: 1.44 NG/DL (ref 0.93–1.7)
TSH SERPL DL<=0.05 MIU/L-ACNC: 2.11 UIU/ML (ref 0.27–4.2)